# Patient Record
Sex: FEMALE | Race: BLACK OR AFRICAN AMERICAN | Employment: PART TIME | URBAN - METROPOLITAN AREA
[De-identification: names, ages, dates, MRNs, and addresses within clinical notes are randomized per-mention and may not be internally consistent; named-entity substitution may affect disease eponyms.]

---

## 2018-01-19 ENCOUNTER — HOSPITAL ENCOUNTER (EMERGENCY)
Age: 33
Discharge: HOME | End: 2018-01-19
Attending: EMERGENCY MEDICINE | Admitting: EMERGENCY MEDICINE
Payer: MEDICAID

## 2018-01-19 ENCOUNTER — APPOINTMENT (OUTPATIENT)
Dept: ULTRASOUND IMAGING | Age: 33
End: 2018-01-19
Attending: PHYSICIAN ASSISTANT
Payer: MEDICAID

## 2018-01-19 VITALS
TEMPERATURE: 99.2 F | HEART RATE: 70 BPM | RESPIRATION RATE: 15 BRPM | DIASTOLIC BLOOD PRESSURE: 79 MMHG | SYSTOLIC BLOOD PRESSURE: 125 MMHG | HEIGHT: 68 IN | OXYGEN SATURATION: 100 % | WEIGHT: 135 LBS | BODY MASS INDEX: 20.46 KG/M2

## 2018-01-19 DIAGNOSIS — O03.9 MISCARRIAGE: ICD-10-CM

## 2018-01-19 DIAGNOSIS — N93.9 VAGINAL BLEEDING: Primary | ICD-10-CM

## 2018-01-19 DIAGNOSIS — E87.6 HYPOKALEMIA: ICD-10-CM

## 2018-01-19 LAB
ABO + RH BLD: NORMAL
ALBUMIN SERPL-MCNC: 3.6 G/DL (ref 3.5–5)
ALBUMIN/GLOB SERPL: 0.8 {RATIO} (ref 1.1–2.2)
ALP SERPL-CCNC: 73 U/L (ref 45–117)
ALT SERPL-CCNC: 68 U/L (ref 12–78)
ANION GAP SERPL CALC-SCNC: 4 MMOL/L (ref 5–15)
APTT PPP: 30 SEC (ref 22.1–32.5)
AST SERPL-CCNC: 48 U/L (ref 15–37)
BASOPHILS # BLD: 0 K/UL (ref 0–0.1)
BASOPHILS NFR BLD: 0 % (ref 0–1)
BILIRUB SERPL-MCNC: 0.4 MG/DL (ref 0.2–1)
BLOOD GROUP ANTIBODIES SERPL: NORMAL
BUN SERPL-MCNC: 5 MG/DL (ref 6–20)
BUN/CREAT SERPL: 8 (ref 12–20)
CALCIUM SERPL-MCNC: 8.6 MG/DL (ref 8.5–10.1)
CHLORIDE SERPL-SCNC: 101 MMOL/L (ref 97–108)
CO2 SERPL-SCNC: 30 MMOL/L (ref 21–32)
CREAT SERPL-MCNC: 0.64 MG/DL (ref 0.55–1.02)
EOSINOPHIL # BLD: 0.1 K/UL (ref 0–0.4)
EOSINOPHIL NFR BLD: 1 % (ref 0–7)
ERYTHROCYTE [DISTWIDTH] IN BLOOD BY AUTOMATED COUNT: 15.5 % (ref 11.5–14.5)
GLOBULIN SER CALC-MCNC: 4.3 G/DL (ref 2–4)
GLUCOSE SERPL-MCNC: 101 MG/DL (ref 65–100)
HCG SERPL-ACNC: ABNORMAL MIU/ML (ref 0–6)
HCT VFR BLD AUTO: 32.4 % (ref 35–47)
HGB BLD-MCNC: 10.7 G/DL (ref 11.5–16)
INR PPP: 1.1 (ref 0.9–1.1)
LYMPHOCYTES # BLD: 2.1 K/UL (ref 0.8–3.5)
LYMPHOCYTES NFR BLD: 24 % (ref 12–49)
MCH RBC QN AUTO: 26.6 PG (ref 26–34)
MCHC RBC AUTO-ENTMCNC: 33 G/DL (ref 30–36.5)
MCV RBC AUTO: 80.4 FL (ref 80–99)
MONOCYTES # BLD: 0.9 K/UL (ref 0–1)
MONOCYTES NFR BLD: 10 % (ref 5–13)
NEUTS SEG # BLD: 5.9 K/UL (ref 1.8–8)
NEUTS SEG NFR BLD: 65 % (ref 32–75)
PLATELET # BLD AUTO: 261 K/UL (ref 150–400)
POTASSIUM SERPL-SCNC: 3.1 MMOL/L (ref 3.5–5.1)
PROT SERPL-MCNC: 7.9 G/DL (ref 6.4–8.2)
PROTHROMBIN TIME: 11 SEC (ref 9–11.1)
RBC # BLD AUTO: 4.03 M/UL (ref 3.8–5.2)
SODIUM SERPL-SCNC: 135 MMOL/L (ref 136–145)
SPECIMEN EXP DATE BLD: NORMAL
THERAPEUTIC RANGE,PTTT: NORMAL SECS (ref 58–77)
WBC # BLD AUTO: 9 K/UL (ref 3.6–11)

## 2018-01-19 PROCEDURE — 74011250636 HC RX REV CODE- 250/636: Performed by: PHYSICIAN ASSISTANT

## 2018-01-19 PROCEDURE — 74011250637 HC RX REV CODE- 250/637: Performed by: OBSTETRICS & GYNECOLOGY

## 2018-01-19 PROCEDURE — 85610 PROTHROMBIN TIME: CPT | Performed by: PHYSICIAN ASSISTANT

## 2018-01-19 PROCEDURE — 86900 BLOOD TYPING SEROLOGIC ABO: CPT | Performed by: PHYSICIAN ASSISTANT

## 2018-01-19 PROCEDURE — 85730 THROMBOPLASTIN TIME PARTIAL: CPT | Performed by: PHYSICIAN ASSISTANT

## 2018-01-19 PROCEDURE — 96372 THER/PROPH/DIAG INJ SC/IM: CPT

## 2018-01-19 PROCEDURE — 96361 HYDRATE IV INFUSION ADD-ON: CPT

## 2018-01-19 PROCEDURE — 36415 COLL VENOUS BLD VENIPUNCTURE: CPT | Performed by: PHYSICIAN ASSISTANT

## 2018-01-19 PROCEDURE — 76817 TRANSVAGINAL US OBSTETRIC: CPT

## 2018-01-19 PROCEDURE — 88305 TISSUE EXAM BY PATHOLOGIST: CPT | Performed by: PHYSICIAN ASSISTANT

## 2018-01-19 PROCEDURE — 74011250636 HC RX REV CODE- 250/636: Performed by: OBSTETRICS & GYNECOLOGY

## 2018-01-19 PROCEDURE — 74011250637 HC RX REV CODE- 250/637: Performed by: PHYSICIAN ASSISTANT

## 2018-01-19 PROCEDURE — 99284 EMERGENCY DEPT VISIT MOD MDM: CPT

## 2018-01-19 PROCEDURE — 80053 COMPREHEN METABOLIC PANEL: CPT | Performed by: PHYSICIAN ASSISTANT

## 2018-01-19 PROCEDURE — 85025 COMPLETE CBC W/AUTO DIFF WBC: CPT | Performed by: PHYSICIAN ASSISTANT

## 2018-01-19 PROCEDURE — 84702 CHORIONIC GONADOTROPIN TEST: CPT | Performed by: PHYSICIAN ASSISTANT

## 2018-01-19 PROCEDURE — 96360 HYDRATION IV INFUSION INIT: CPT

## 2018-01-19 RX ORDER — POTASSIUM CHLORIDE 20 MEQ/1
40 TABLET, EXTENDED RELEASE ORAL
Status: COMPLETED | OUTPATIENT
Start: 2018-01-19 | End: 2018-01-19

## 2018-01-19 RX ORDER — MISOPROSTOL 100 UG/1
800 TABLET ORAL ONCE
Status: COMPLETED | OUTPATIENT
Start: 2018-01-19 | End: 2018-01-19

## 2018-01-19 RX ADMIN — POTASSIUM CHLORIDE 40 MEQ: 20 TABLET, EXTENDED RELEASE ORAL at 11:30

## 2018-01-19 RX ADMIN — HUMAN RHO(D) IMMUNE GLOBULIN 0.3 MG: 300 INJECTION, SOLUTION INTRAMUSCULAR at 13:49

## 2018-01-19 RX ADMIN — Medication 800 MCG: at 13:10

## 2018-01-19 RX ADMIN — SODIUM CHLORIDE 1000 ML: 900 INJECTION, SOLUTION INTRAVENOUS at 10:08

## 2018-01-19 NOTE — ED NOTES
Patient states bleeding has decreased tremendously. MD Cary Moreno made aware. [id number] Patient provided with paper scrubs. Patient currently looking for a ride home.

## 2018-01-19 NOTE — ED PROVIDER NOTES
EMERGENCY DEPARTMENT HISTORY AND PHYSICAL EXAM      Date: 2018  Patient Name: Ijeoma Ramesh    History of Presenting Illness     Chief Complaint   Patient presents with    Vaginal Bleeding     began yesterday. going through 1 pad an hour       History Provided By: Patient    HPI: Ijeoma Ramesh, 28 y.o. female with PMHx significant for sickle cell trait, presents ambulatory to the ED with cc of sudden onset vaginal bleeding x yesterday. Pt also c/o nausea x week. Pt reports missing her cycle last month. She thought she was pregnant but notes vaginal bleeding yesterday. Pt thought she came on her cycle but reports seeing a large blood clot in the toilet. She denies taking any home pregnancy test. She did have recent sexual intercourse but does note using condoms. Pt specifically denies any abdominal pain or cramping, vomiting, dysuria, hematuria, urinary frequency, CP, SOB, cough, breast tenderness. Social Hx: + Tobacco (0.5 ppd), + EtOH (weekends), + illicit drug use (heroin)    PCP: None    There are no other complaints, changes, or physical findings at this time.         Past History     Past Medical History:  Past Medical History:   Diagnosis Date    Abnormal Papanicolaou smear of cervix     Back pain     Breast disorder     lump in left breast    Fatigue     Ill-defined condition     Sickle Cell    Joint pain     Musculoskeletal disorder     Sickle cell trait (HCC)     trait       Past Surgical History:  Past Surgical History:   Procedure Laterality Date     DELIVERY ONLY      c/s in     HX GYN             Family History:  Family History   Problem Relation Age of Onset    Diabetes Maternal Grandmother     Hypertension Maternal Grandmother     Cancer Maternal Grandfather        Social History:  Social History   Substance Use Topics    Smoking status: Current Every Day Smoker     Packs/day: 0.50     Years: 8.00    Smokeless tobacco: None    Alcohol use No Comment: weekends       Allergies:  No Known Allergies      Review of Systems   Review of Systems   Constitutional: Negative. Negative for activity change, appetite change, chills, fatigue, fever and unexpected weight change. HENT: Negative. Negative for congestion, hearing loss, rhinorrhea, sneezing and voice change. Eyes: Negative. Negative for pain and visual disturbance. Respiratory: Negative. Negative for apnea, cough, choking, chest tightness and shortness of breath. Cardiovascular: Negative. Negative for chest pain and palpitations. Gastrointestinal: Positive for nausea. Negative for abdominal distention, abdominal pain, blood in stool, diarrhea and vomiting. Genitourinary: Positive for vaginal bleeding. Negative for difficulty urinating, dysuria, flank pain, frequency and urgency. Musculoskeletal: Negative. Negative for arthralgias, back pain, myalgias and neck stiffness. Skin: Negative. Negative for color change and rash. Neurological: Negative. Negative for dizziness, seizures, syncope, speech difficulty, weakness, numbness and headaches. Hematological: Negative for adenopathy. Psychiatric/Behavioral: Negative. Negative for agitation, behavioral problems, dysphoric mood and suicidal ideas. The patient is not nervous/anxious. Physical Exam   Physical Exam   Constitutional: She is oriented to person, place, and time. She appears well-developed and well-nourished. No distress. HENT:   Head: Normocephalic and atraumatic. Right Ear: External ear normal.   Left Ear: External ear normal.   Nose: Nose normal.   Mouth/Throat: Oropharynx is clear and moist. No oropharyngeal exudate. Eyes: Conjunctivae and EOM are normal. Pupils are equal, round, and reactive to light. Right eye exhibits no discharge. Left eye exhibits no discharge. No scleral icterus. Neck: Normal range of motion. Neck supple. No JVD present. No tracheal deviation present.    Cardiovascular: Normal rate, regular rhythm, normal heart sounds and intact distal pulses. Exam reveals no gallop and no friction rub. No murmur heard. Pulmonary/Chest: Effort normal and breath sounds normal. No respiratory distress. She has no wheezes. She has no rales. She exhibits no tenderness. Abdominal: Soft. Bowel sounds are normal. She exhibits no distension and no mass. There is no tenderness. There is no rigidity, no rebound and no guarding. Genitourinary:   Genitourinary Comments: PELVIC EXAM:  Large amount of what appears to be a large organized clot. No definite product of conception. No significant Tenderness. Musculoskeletal: Normal range of motion. She exhibits no edema or tenderness. Lymphadenopathy:     She has no cervical adenopathy. Neurological: She is alert and oriented to person, place, and time. She has normal reflexes. No cranial nerve deficit. She exhibits normal muscle tone. Coordination normal.   Skin: Skin is warm and dry. She is not diaphoretic. Psychiatric: She has a normal mood and affect. Her behavior is normal. Judgment and thought content normal.   Nursing note and vitals reviewed. Diagnostic Study Results     Labs -     Recent Results (from the past 12 hour(s))   BETA HCG, QT    Collection Time: 01/19/18  8:20 AM   Result Value Ref Range    [place] [address] (H) 0 - 6 MIU/ML   CBC WITH AUTOMATED DIFF    Collection Time: 01/19/18 10:09 AM   Result Value Ref Range    WBC 9.0 3.6 - 11.0 K/uL    RBC 4.03 3.80 - 5.20 M/uL    HGB 10.7 (L) 11.5 - 16.0 g/dL    HCT 32.4 (L) 35.0 - 47.0 %    MCV 80.4 80.0 - 99.0 FL    MCH 26.6 26.0 - 34.0 PG    MCHC 33.0 30.0 - 36.5 g/dL    RDW 15.5 (H) 11.5 - 14.5 %    PLATELET 745 669 - 721 K/uL    NEUTROPHILS 65 32 - 75 %    LYMPHOCYTES 24 12 - 49 %    MONOCYTES 10 5 - 13 %    EOSINOPHILS 1 0 - 7 %    BASOPHILS 0 0 - 1 %    ABS. NEUTROPHILS 5.9 1.8 - 8.0 K/UL    ABS. LYMPHOCYTES 2.1 0.8 - 3.5 K/UL    ABS. MONOCYTES 0.9 0.0 - 1.0 K/UL    ABS. EOSINOPHILS 0.1 0.0 - 0.4 K/UL    ABS. BASOPHILS 0.0 0.0 - 0.1 K/UL   METABOLIC PANEL, COMPREHENSIVE    Collection Time: 01/19/18 10:09 AM   Result Value Ref Range    Sodium 135 (L) 136 - 145 mmol/L    Potassium 3.1 (L) 3.5 - 5.1 mmol/L    Chloride 101 97 - 108 mmol/L    CO2 30 21 - 32 mmol/L    Anion gap 4 (L) 5 - 15 mmol/L    Glucose 101 (H) 65 - 100 mg/dL    BUN 5 (L) 6 - 20 MG/DL    Creatinine 0.64 0.55 - 1.02 MG/DL    BUN/Creatinine ratio 8 (L) 12 - 20      GFR est AA >60 >60 ml/min/1.73m2    GFR est non-AA >60 >60 ml/min/1.73m2    Calcium 8.6 8.5 - 10.1 MG/DL    Bilirubin, total 0.4 0.2 - 1.0 MG/DL    ALT (SGPT) 68 12 - 78 U/L    AST (SGOT) 48 (H) 15 - 37 U/L    Alk. phosphatase 73 45 - 117 U/L    Protein, total 7.9 6.4 - 8.2 g/dL    Albumin 3.6 3.5 - 5.0 g/dL    Globulin 4.3 (H) 2.0 - 4.0 g/dL    A-G Ratio 0.8 (L) 1.1 - 2.2     PROTHROMBIN TIME + INR    Collection Time: 01/19/18 10:09 AM   Result Value Ref Range    INR 1.1 0.9 - 1.1      Prothrombin time 11.0 9.0 - 11.1 sec   PTT    Collection Time: 01/19/18 10:09 AM   Result Value Ref Range    aPTT 30.0 22.1 - 32.5 sec    aPTT, therapeutic range     58.0 - 77.0 SECS   TYPE & SCREEN    Collection Time: 01/19/18 10:09 AM   Result Value Ref Range    Crossmatch Expiration 01/22/2018     ABO/Rh(D) O NEGATIVE     Antibody screen NEG    RH IMMUNE GLOBULIN PROPHYLACTIC    Collection Time: 01/19/18  1:37 PM   Result Value Ref Range    Comment 8 WKS PER SHAYLEE DEVLIN     Unit number [id number]     Blood component type RH IMMUNE GLOBULIN     Unit division 00     Status of unit ISSUED        Radiologic Studies -   US UTS TRANSVAGINAL OB   Final Result   Study Result      EXAM:  US UTS TRANSVAGINAL OB      INDICATION: Vaginal bleeding.     COMPARISON: None.     TECHNIQUE:  Real-time endovaginal sonography of the pelvis was performed using.  Multiple  static images of the uterus and ovaries were obtained.     FINDINGS:  UTERUS:  Uterus measures 8.4 x 6.4 x 5.6 cm and there is 1.5 x 1.3 cm hypoechoic area in  the myometrium may represent small leiomyoma. No definite intrauterine  gestational sac is identified.     ENDOMETRIUM:  The endometrial stripe measures 15 mm.      RIGHT OVARY:  The right ovary measures 4.8 x 2 x 3.8 cm. . There is a 2.4 x 1.5 x 2.1 cm  hypoechoic area which is inseparable from the right ovary with a small cystic  area centrally. Flow is identified to the right ovary     LEFT OVARY:  The left ovary measures 2.2 x 1.2 x 2.9 cm and flow is identified.     CUL-DE-SAC:  There is a trace amount of free fluid in the cul-de-sac     IMPRESSION  IMPRESSION:    1. Endometrium is slightly thickened measuring 15 mm and no intrauterine  gestational sac is identified. The thickened endometrium could be related to  spontaneous  or possibly to an ectopic. ..  2. There is a 2.4 x 1.5 x 2.1 cm hypoechoic area which blends in with the right  ovary contains a small cystic area and could represent an ectopic or possibly  corpus luteum. Medical Decision Making   I am the first provider for this patient. I reviewed the vital signs, available nursing notes, past medical history, past surgical history, family history and social history. Vital Signs-Reviewed the patient's vital signs. Patient Vitals for the past 12 hrs:   Temp Pulse Resp BP SpO2   18 1146 - 70 15 125/79 100 %   18 0813 99.2 °F (37.3 °C) 83 14 (!) 128/95 99 %     Records Reviewed: Nursing Notes    Provider Notes (Medical Decision Making):   DDx: Pregnancy, menorrhagia. ED Course:   Initial assessment performed. The patients presenting problems have been discussed, and they are in agreement with the care plan formulated and outlined with them. I have encouraged them to ask questions as they arise throughout their visit. PROGRESS NOTE:  9:40 AM  Pt's quant is elevated.  Will order an US and perform a pelvic exam.     Procedure Note - Pelvic Exam:    9:44 AM  Performed by: Antonia Moore [place]  Chaperoned by: Sundar Lira  tech. Pelvic exam was performed using bimanual and speculum. Further findings noted in physical exam.   The procedure took 1-15 minutes, and pt tolerated well. PROGRESS NOTE:  9:58 AM  Pt's clots were placed in a specimen bottle and plan to send them off to lab for a pathology study. PROGRESS NOTE:  10:52 AM  Spoke with  tech as she was exiting the room, she did not see anything in the uterus and was unsure if she saw something in her tubes. Pending official reading. Nursing notes the pt has been pregnant 7 times total but miscarried 6 of her pregnancies. Pt is a A6.    CONSULT NOTE:   11:32 AM  ROWENA Chicas spoke with Dr. Chris Mcclain,   Specialty: OB/GYN [facility]. Discussed pt's hx, disposition, and available diagnostic and imaging results. Reviewed care plans. Consultant agrees with plans as outlined. She will come in to see the pt. PROGRESS NOTE:  12:29 PM  Dr. Chris Mcclain at bedside to evaluate pt. PROGRESS NOTE:  2:40 PM  Dr. Chris Mcclain states the pt can be discharged when she finishes her current treatment. Critical Care Time:   None. Disposition:  DISCHARGE NOTE  3:11 PM  The patient has been re-evaluated and is ready for discharge. Reviewed available results with patient. Counseled pt on diagnosis and care plan. Pt has expressed understanding, and all questions have been answered. Pt agrees with plan and agrees to F/U as recommended, or return to the ED if their sxs worsen. Discharge instructions have been provided and explained to the pt, along with reasons to return to the ED. PLAN:  1. There are no discharge medications for this patient.     2.   Follow-up Information     Follow up With Details Comments Contact Info    None In 2 days As needed None ([phone]) Patient stated that they have no PCP      Jenna Billings MD  as scheduled [id number] [facility]  [phone] Return to ED if worse     Diagnosis     Clinical Impression:   1. Vaginal bleeding    2. Hypokalemia    3. Miscarriage        Attestations: This note is prepared by Anu Waters acting as Scribe for [employer]    ROWENA Lennon : The scribe's documentation has been prepared under my direction and personally reviewed by me in its entirety. I confirm that the note above accurately reflects all work, treatment, procedures, and medical decision making performed by me.

## 2018-01-19 NOTE — ED NOTES
Patient reports to ED with complaints of vaginal bleeding that began yesterday. Patient states her last cycle was the beginning of December and she began to think she was pregnant because she missed her cycle for January and she was experiencing pregnancy symptoms such as n/v and fatigue. Patient states yesterday, she began to spotting, and today, she woke up with increased bleeding. She states she has been going through approximately 1 pad an hour and she has bled through her pants. Patient also reports clots. Daughter at bedside.

## 2018-01-19 NOTE — ED NOTES
Patient sheets changed prior to going to ultrasound. Patient provided with extra pads and maternity pants. Patient's daughter remains in ED room while patient to [address]. Daughter provided with juice and crackers.

## 2018-01-19 NOTE — DISCHARGE INSTRUCTIONS
Abnormal Uterine Bleeding: Care Instructions  Your Care Instructions    Abnormal uterine bleeding (AUB) is irregular bleeding from the uterus that is longer or heavier than usual or does not occur at your regular time. Sometimes it is caused by changes in hormone levels. It can also be caused by growths in the uterus, such as fibroids or polyps. Sometimes a cause cannot be found. You may have heavy bleeding when you are not expecting your period. Your doctor may suggest a pregnancy test, if you think you are pregnant. Follow-up care is a key part of your treatment and safety. Be sure to make and go to all appointments, and call your doctor if you are having problems. It's also a good idea to know your test results and keep a list of the medicines you take. How can you care for yourself at home? · Be safe with medicines. Take pain medicines exactly as directed. ¨ If the doctor gave you a prescription medicine for pain, take it as prescribed. ¨ If you are not taking a prescription pain medicine, ask your doctor if you can take an over-the-counter medicine. · You may be low in iron because of blood loss. Eat a balanced diet that is high in iron and vitamin C. Foods rich in iron include red meat, shellfish, eggs, beans, and leafy green vegetables. Talk to your doctor about whether you need to take iron pills or a multivitamin. When should you call for help? Call 911 anytime you think you may need emergency care. For example, call if:  ? · You passed out (lost consciousness). ?Call your doctor now or seek immediate medical care if:  ? · You have new or worse belly or pelvic pain. ? · You have severe vaginal bleeding. ? · You feel dizzy or lightheaded, or you feel like you may faint. ? Watch closely for changes in your health, and be sure to contact your doctor if:  ? · You think you may be pregnant. ? · Your bleeding gets worse. ? · You do not get better as expected.    Where can you learn more?  Go to http://dav-marcella.info/. Enter W508 in the search box to learn more about \"Abnormal Uterine Bleeding: Care Instructions. \"  Current as of: October 13, 2016  Content Version: 11.4  © 9849-9671 MEETiiN. Care instructions adapted under license by [employer] (which disclaims liability or warranty for this information). If you have questions about a medical condition or this instruction, always ask your healthcare professional. [address] any warranty or liability for your use of this information. Electrolyte Imbalance: Care Instructions  Your Care Instructions  Electrolytes are minerals in your blood. They include sodium, potassium, calcium, and magnesium. When they are not at the right levels, you can feel very ill. You may not know what is causing it, but you know something is wrong. You may feel weak or numb, have muscle spasms, or twitch. Your heart may beat fast. Symptoms are different with each mineral. Too much is as bad as too little. Minerals help keep your body working as it should. Vomiting, diarrhea, and fever can cause you to lose minerals. A problem with your kidneys can tip a mineral out of balance. So can taking certain medicines. Your doctor may do more tests. He or she may change your medicine and diet. If you are low in one or more minerals, they may be given through a tube into your vein (IV). Your doctor may have you take or drink special fluids or pills. If your kidneys are failing, your blood may be filtered. This is called dialysis. It can put the minerals back in balance. Follow-up care is a key part of your treatment and safety. Be sure to make and go to all appointments, and call your doctor if you are having problems. It's also a good idea to know your test results and keep a list of the medicines you take. How can you care for yourself at home? · Take your medicines exactly as prescribed.  Call your doctor if you have any problems with your medicines. You will get more details on the specific medicines your doctor prescribes. · Do not take any medicine without talking to your doctor first. This includes prescription, over-the-counter, and herbal medicines. · If you have kidney, heart, or liver disease and have to limit fluids, talk with your doctor before you increase the amount of fluids you drink. · Your doctor or dietitian may give you a diet plan to help balance your minerals. Follow the diet carefully. When should you call for help? Call 911 anytime you think you may need emergency care. For example, call if:  ? · You passed out (lost consciousness). ? · Your heartbeat seems to be irregular. It might be speeding up and then slowing down or skipping beats. ?Call your doctor now or seek immediate medical care if:  ? · You have muscle aches. ? · You feel very weak. ? · You are confused or cannot think clearly. ? Watch closely for changes in your health, and be sure to contact your doctor if:  ? · You do not get better as expected. Where can you learn more? Go to http://dav-marcella.info/. Enter A198 in the search box to learn more about \"Electrolyte Imbalance: Care Instructions. \"  Current as of: May 12, 2017  Content Version: 11.4  © 0408-6120 Polar OLED. Care instructions adapted under license by [employer]uz[employer] (which disclaims liability or warranty for this information). If you have questions about a medical condition or this instruction, always ask your healthcare professional. [place] [address] any warranty or liability for your use of this information. Hypokalemia: Care Instructions  Your Care Instructions    Hypokalemia (say \"hy-mj-prd-CHANDAN-enoch-uh\") is a low level of potassium. The heart, muscles, kidneys, and nervous system all need potassium to work well. This problem has many different causes.  Kidney problems, diet, and medicines like diuretics and laxatives can cause it. So can vomiting or diarrhea. In some cases, cancer is the cause. Your doctor may do tests to find the cause of your low potassium levels. You may need medicines to bring your potassium levels back to normal. You may also need regular blood tests to check your potassium. If you have very low potassium, you may need intravenous (IV) medicines. You also may need tests to check the electrical activity of your heart. Heart problems caused by low potassium levels can be very serious. Follow-up care is a key part of your treatment and safety. Be sure to make and go to all appointments, and call your doctor if you are having problems. It's also a good idea to know your test results and keep a list of the medicines you take. How can you care for yourself at home? · If your doctor recommends it, eat foods that have a lot of potassium. These include fresh fruits, juices, and vegetables. They also include nuts, beans, and milk. · Be safe with medicines. If your doctor prescribes medicines or potassium supplements, take them exactly as directed. Call your doctor if you have any problems with your medicines. · Get your potassium levels tested as often as your doctor tells you. When should you call for help? Call 911 anytime you think you may need emergency care. For example, call if:  ? · You feel like your heart is missing beats. Heart problems caused by low potassium can cause death. ? · You passed out (lost consciousness). ? · You have a seizure. ?Call your doctor now or seek immediate medical care if:  ? · You feel weak or unusually tired. ? · You have severe arm or leg cramps. ? · You have tingling or numbness. ? · You feel sick to your stomach, or you vomit. ? · You have belly cramps. ? · You feel bloated or constipated. ? · You have to urinate a lot. ? · You feel very thirsty most of the time.    ? · You are dizzy or lightheaded, or you feel like you may faint. ? · You feel depressed, or you lose touch with reality. ? Watch closely for changes in your health, and be sure to contact your doctor if:  ? · You do not get better as expected. Where can you learn more? Go to http://dav-marcella.info/. Enter G358 in the search box to learn more about \"Hypokalemia: Care Instructions. \"  Current as of: May 12, 2017  Content Version: 11.4  © 8669-9316 Fly6. Care instructions adapted under license by Viddsee (which disclaims liability or warranty for this information). If you have questions about a medical condition or this instruction, always ask your healthcare professional. [address] any warranty or liability for your use of this information. Miscarriage: Care Instructions  Your Care Instructions    The loss of a pregnancy can be very hard. You may wonder why it happened or blame yourself. Miscarriages are common and are not caused by exercise, stress, or sex. Most happen because the fertilized egg in the uterus does not develop normally. There is no treatment that can stop a miscarriage. As long as you do not have heavy blood loss, fever, weakness, or other signs of infection, you can let a miscarriage follow its own course. This can take several days. Your body will recover over the next several weeks. Having a miscarriage does not mean you cannot have a normal pregnancy in the future. The doctor has checked you carefully, but problems can develop later. If you notice any problems or new symptoms, get medical treatment right away. Follow-up care is a key part of your treatment and safety. Be sure to make and go to all appointments, and call your doctor if you are having problems. It's also a good idea to know your test results and keep a list of the medicines you take. How can you care for yourself at home?   · You will probably have some vaginal bleeding for 1 to 2 weeks. It may be similar to or slightly heavier than a normal period. The bleeding should get lighter after a week. Use pads instead of tampons. You may use tampons during your next period, which should start in 3 to 6 weeks. · Take an over-the-counter pain medicine, such as acetaminophen (Tylenol), ibuprofen (Advil, Motrin), or naproxen (Aleve) for cramps. Read and follow all instructions on the label. You may have cramps for several days after the miscarriage. · Do not take two or more pain medicines at the same time unless the doctor told you to. Many pain medicines have acetaminophen, which is Tylenol. Too much acetaminophen (Tylenol) can be harmful. · Use a clear container to save any tissue that you pass. Take it to your doctor's office as soon as you can. · Do not have sex until the bleeding stops. · You may return to your normal activities if you feel well enough to do so. But you should avoid heavy exercise until the bleeding stops. · If you plan to get pregnant again, check with your doctor. Most doctors suggest waiting until you have had at least one normal period before you try to get pregnant. · If you do not want to get pregnant, ask your doctor about birth control. You can get pregnant again before your next period starts if you are not using birth control. · You may be low in iron because of blood loss. Eat a balanced diet that is high in iron and vitamin C. Foods rich in iron include red meat, shellfish, eggs, beans, and leafy green vegetables. Foods high in vitamin C include citrus fruits, tomatoes, and broccoli. Talk to your doctor about whether you need to take iron pills or a multivitamin. · The loss of a pregnancy can be very hard. You may wonder why it happened and blame yourself. Talking to family members, friends, a counselor, or your doctor may help you cope with your loss. When should you call for help? Call 911 anytime you think you may need emergency care.  For example, call if:  ? · You passed out (lost consciousness). ?Call your doctor now or seek immediate medical care if:  ? · You have severe vaginal bleeding. ? · You are dizzy or lightheaded, or you feel like you may faint. ? · You have new or worse pain in your belly or pelvis. ? · You have a fever. ? · You have vaginal discharge that smells bad. ? Watch closely for changes in your health, and be sure to contact your doctor if:  ? · You do not get better as expected. Where can you learn more? Go to http://dav-marcella.info/. Enter E802 in the search box to learn more about \"Miscarriage: Care Instructions. \"  Current as of: March 16, 2017  Content Version: 11.4  © 3285-9715 SheZoom. Care instructions adapted under license by [employer] (which disclaims liability or warranty for this information). If you have questions about a medical condition or this instruction, always ask your healthcare professional. [address] any warranty or liability for your use of this information.

## 2018-01-20 LAB
BLD PROD TYP BPU: NORMAL
BPU ID: NORMAL
GA (WEEKS): NORMAL WK
STATUS OF UNIT,%ST: NORMAL
UNIT DIVISION, %UDIV: 0

## 2018-01-29 NOTE — CONSULTS
Primary Provider:  Chata Rankin MD      History of Present Illness:  [id number] presented to ER. Thinks she is 3 weeks late for menses. Started bleeding 2 days ago. Yesterday bleeding was very heavy and she passed a large clot in the toilet. Bleeding continued heavy today so she came to the ER.  MIld pain but not severe. Now dizziness, palpitations. Known Rh neg. In ER quant 24,000. Ultrasound showed thickened endometrium, normal adnexa. No IUP indentified. Initial exam showed clot in the vagina which was sent to pathology. Vital Signs   28Years Old Female  BP:       120/60    Past Pregnancy History   : 7  Para:     2  Aborta:  4  Term: 1, Premature: 2, Living Children: 1, Vaginal Deliveries: 1, C-Sections: 1, Elect. Ab: 3, Ectopics: 0    Gynecologic History   History of abnormal pap: yes  Gardasil Injection History: Pt Advised/Considering Gardasil  Pt currently sexually active: no  Pt ever sexually active: yes  Current Contraception: None.    History of STD: yes   STD Type: HPV     Allergies      Medications Removed from Medication List          Past Medical History:     Reviewed history from 2016 and no changes required:        Seasonal Allergies        sickle cell trait      Past Surgical History:     Reviewed history from 2016 and no changes required:        [id number]        Vaginal Deliveryx1 (stillborn) @ 24 weeks-        EAB x3 (per pt 3/15 DB)        [id number]  of IUFD at 21 5/7 wks   Dr. Adam Reyes    Family History Summary:      Reviewed history Last on 2016 and no changes required:2018  MGF - Has Family History of Colon Cancer - Entered On: 2016    General Comments - FH:  [facility] [employer] [facility] [address] [place] [address]     Social History:     Reviewed history from 2012 and no changes required:        Single        Works at [employer]        Past Pregnancy History      :  7     Premature Births: 2    Pregnancy # 7     Delivery date:   2018     Comments:  presumed sab in ER      Review of Systems        See HPI    Except as noted in the HPI, the review of systems is negative for General, Breast, , Resp, GI, Endo, MS, Psych and Heme. General Medical Physical Exam:     General Appearance:       no acute distress    Head: Inspection:  normal    Eyes:        External:  EOM intact    Ears, Nose, Throat:        External:  normocephalic and atraumatic       Hearing:  grossly intact    Neck:        Neck:  supple; no masses; trachea midline    Respiratory:        Resp. effort:  no use of accessory muscles    Cardiovascular:       Peripheral circ: no cyanosis, clubbing, or edema    Gastrointestinal:        Abdomen:  soft and non-tender; no masses       Liver/spleen:     no enlargement or nodularity       Hernia:  no hernias    Genitourinary:        Ext. genitalia: covered in blood       Urethra:  no discharge       Bladder:  no cystocele       Vagina:  large amount of blood in vault       Cervix:  slightly open, clot coming from os       Uterus:  slightly enlarged, on tender       Adnexa:  no masses or tenderness    Musculoskeletal:        Gait/station:  normal gait    Skin:        Inspection:  no rashes, suspicious lesions, or ulcerations    Neurological:        Other:  grossly intact    Psychiatric:       Judgement:  intact       Orientation:  oriented to time, place, and person              Impression & Recommendations:    Problem # 1:  Incomplete  (ICD-634.91) (QIM42-O47.4)  Exam and history c/w spontaneous ab and not ectopic. Likely she passed the bulk of the tissue at home. Ultrasound does not show obvious retained but bleeding is still brisk. Vitals are stable and pain is controlled. Will try buccal misoprostol to help with uterine contractility and see if bleeding normalizes. If it does not, recommend D&C. Risks and benefits reviewed. Will keep npo for now. 1 hour after miso bleeding improved drastically. will d/c home. She has strict precautions to return if bleeding returns to the amount it was upon arrival today. If bleeding is stable, she will see me in the office on Monday for exam and quant as we have not completely verified this was an IUP.     Will give rhogam in hospital.        Orders:  Patient Sent to Hospital (CPT-HOSPGYN)  ER Outpatient Obs - High (CPT-AdmitF)      Medications (at conclusion of this visit)    01/18/2016 ZOFRAN ODT 4 MG ORAL TABLET DISINTEGRATING (ONDANSETRON) 1 by mouth every 4--6 hrs prn for nausea and vomiting              ________________________________________________________________________

## 2018-04-16 ENCOUNTER — HOSPITAL ENCOUNTER (EMERGENCY)
Age: 33
Discharge: HOME | End: 2018-04-16
Attending: EMERGENCY MEDICINE
Payer: MEDICAID

## 2018-04-16 VITALS
SYSTOLIC BLOOD PRESSURE: 132 MMHG | TEMPERATURE: 99 F | DIASTOLIC BLOOD PRESSURE: 77 MMHG | HEART RATE: 77 BPM | RESPIRATION RATE: 18 BRPM | OXYGEN SATURATION: 100 % | WEIGHT: 121.47 LBS | HEIGHT: 68 IN | BODY MASS INDEX: 18.41 KG/M2

## 2018-04-16 DIAGNOSIS — Z20.2 POSSIBLE EXPOSURE TO STD: ICD-10-CM

## 2018-04-16 DIAGNOSIS — N75.0 BARTHOLIN GLAND CYST: Primary | ICD-10-CM

## 2018-04-16 LAB
APPEARANCE UR: ABNORMAL
BACTERIA URNS QL MICRO: NEGATIVE /HPF
BILIRUB UR QL: NEGATIVE
COLOR UR: ABNORMAL
EPITH CASTS URNS QL MICRO: ABNORMAL /LPF
GLUCOSE UR STRIP.AUTO-MCNC: NEGATIVE MG/DL
HCG UR QL: NEGATIVE
HGB UR QL STRIP: NEGATIVE
KETONES UR QL STRIP.AUTO: NEGATIVE MG/DL
LEUKOCYTE ESTERASE UR QL STRIP.AUTO: ABNORMAL
NITRITE UR QL STRIP.AUTO: NEGATIVE
PH UR STRIP: 5.5 [PH] (ref 5–8)
PROT UR STRIP-MCNC: NEGATIVE MG/DL
RBC #/AREA URNS HPF: ABNORMAL /HPF (ref 0–5)
SP GR UR REFRACTOMETRY: 1.02 (ref 1–1.03)
UA: UC IF INDICATED,UAUC: ABNORMAL
UROBILINOGEN UR QL STRIP.AUTO: 1 EU/DL (ref 0.2–1)
WBC URNS QL MICRO: ABNORMAL /HPF (ref 0–4)

## 2018-04-16 PROCEDURE — 74011250636 HC RX REV CODE- 250/636: Performed by: PHYSICIAN ASSISTANT

## 2018-04-16 PROCEDURE — 81025 URINE PREGNANCY TEST: CPT

## 2018-04-16 PROCEDURE — 74011250637 HC RX REV CODE- 250/637: Performed by: PHYSICIAN ASSISTANT

## 2018-04-16 PROCEDURE — 87086 URINE CULTURE/COLONY COUNT: CPT | Performed by: PHYSICIAN ASSISTANT

## 2018-04-16 PROCEDURE — 87491 CHLMYD TRACH DNA AMP PROBE: CPT | Performed by: PHYSICIAN ASSISTANT

## 2018-04-16 PROCEDURE — 96372 THER/PROPH/DIAG INJ SC/IM: CPT

## 2018-04-16 PROCEDURE — 74011000250 HC RX REV CODE- 250: Performed by: PHYSICIAN ASSISTANT

## 2018-04-16 PROCEDURE — 81001 URINALYSIS AUTO W/SCOPE: CPT | Performed by: PHYSICIAN ASSISTANT

## 2018-04-16 PROCEDURE — 99284 EMERGENCY DEPT VISIT MOD MDM: CPT

## 2018-04-16 RX ORDER — AZITHROMYCIN 250 MG/1
1000 TABLET, FILM COATED ORAL
Status: COMPLETED | OUTPATIENT
Start: 2018-04-16 | End: 2018-04-16

## 2018-04-16 RX ADMIN — AZITHROMYCIN 1000 MG: 250 TABLET, FILM COATED ORAL at 22:31

## 2018-04-16 RX ADMIN — LIDOCAINE HYDROCHLORIDE 250 MG: 10 INJECTION, SOLUTION EPIDURAL; INFILTRATION; INTRACAUDAL; PERINEURAL at 22:31

## 2018-04-16 NOTE — LETTER
4/19/2018 Sherren M[address] [id number] Dear Ms. Rand Vigil, You were recently seen in the Emergency Department of [place] [address]. and had lab work performed. We would like to discuss these results with you. Please call the Emergency Department at your earliest convenience at [phone] between 10am-8pm to speak with one of our providers. Sincerely, NAVA Hooker 
 
 
[facility] EMERGENCY DEPT 
[address] [place] [address] 
[phone]

## 2018-04-17 NOTE — ED NOTES
Discharge instructions reviewed with pt. Discharge instructions given to pt per PA. Pt able to return/verbalize discharge instructions. Copy of discharge instructions given. Pt condition stable, respiratory status within normal limits, neuro status intact. Pt ambulatory out of ER, accompanied by male .

## 2018-04-17 NOTE — DISCHARGE INSTRUCTIONS
[facility] Departments     For adult and child immunizations, family planning, TB screening, STD testing and women's health services. [employer]: Álvaro [phone]      [address]   [address]   [address]   [address] [facility] [address]: Duke Morgan [address] [facility] [address] [phone]      [address] [facility]          [address] [place] [address]     For primary care services, woman and child wellness, and some clinics providing specialty care. [facility] -- [address] [facility] [phone]   [address] [facility] 200 [facility] 3614 [facility] [phone]   [address] [facility] [address] [facility] [address] [phone]   [address]  [email or web address] [address] [place] [address] [address]  [phone]   [address] [phone]   [facility] [address] [facility] [phone]   [facility] 1051 [facility] [phone]   [facility]: [facility] [address] [place] [address], [address] [phone]     [address] [phone]   [facility] [address]  [phone]   [employer]  [address] ([email or web address]) [phone]         Sexual Health/Woman Wellness Clinics    For STD/HIV testing and treatment, pregnancy testing and services, men's health, birth control services, LGBT services, and hepatitis/HPV vaccine services. [employer] for Ethel [employer] [address] [facility] [address] [facility] [address] [facility] 1579 [address] [phone]   [facility] [address], 5th floor [phone]   Pregnancy [address] for Women [facility] MICHELE Bajaw [phone]         Specialty Service 17[address] [facility]   [phone]   [employer]   [phone]   Women, Infant and Children's Services: [address] [phone]       [address] [facility]   [phone]   [address]   [facility]     [phone]   [address] Crisis   [address] [phone]     Local Primary Care Physicians  [facility] [phone]  MD Eriberto Wallis MD Mazie Millman, MD [facility] Doctors [phone]  Carlo Hyman, P  MD Becka Brink, MD [place] [address] [phone]  [place] [address] [facility] [phone]  MD Quin Hall MD Dewitt Arms, MD Eda Mail, MD   [facility] [phone]  IDWP MD Cachorro TATUM MD Daralene Schlichter, NP [address] [phone]  MD Fco Tracy MD Annia Sprout, MD Saira Iyer MD Jinnie Drum, MD Driss Quick MD   [address] [facility]  Carmen Marin MD [address] [facility] [address] [phone]  MD Patricia Yao, NP  Elieser Bonilla, MD Lev Luo MD Gaetano Rolling, MD Rosaleen Octave, MD   [address] [facility] [phone]  MD Layla Ferrera, [facility]  Kaylen Diaz, NP  MD Dayan Chen MD Egbert Massa, MD Evorn Angst, MD [facility] [phone]  MD Momo Thomas MD Coni Martinez, MD Kae Campi, MD Bert Maizes, MD   [address] [phone]  MD Emmanuel Hernandez MD [facility] [phone]  MD Char Duarte MD Gennie Dice Tamy Alvares, [address] [facility] [phone]  [employer], MD Olamide Villasenor, MD Joni Zazueta, MD Andre Delgado, MD [address] Moore, SHADI Ramos MD [address]   [phone]  MD Lucinda Jean Baptiste, MD Obi Martinez MD   2[address] [facility] [phone]  Bunny Rodríguez, MD Elisa Milton, FARZAD Diaz, ROWENA Diaz, ROWENA Herring, MD Kevin Springer, NP   Ludwig Nicholson, DO Miscellaneous:  Val Salazar [phone]

## 2018-04-17 NOTE — ED PROVIDER NOTES
EMERGENCY DEPARTMENT HISTORY AND PHYSICAL EXAM      Date: 2018  Patient Name: Magdi Warren    History of Presenting Illness     Chief Complaint   Patient presents with    Skin Problem     patient reports having \"a bump on her vagina\" patient states that it started 3 days ago patient states that the bump is swollen and and painful but not red        History Provided By: Patient    HPI: Magdi Warren, 28 y.o. female with PMHx significant for sickle cell trait / hepatitis C, presents ambulatory to the ED with cc of gradual onset of a progressively worsening and painful abscess adjacent to her vagina that has been ongoing for the last 3 days. Pt also expresses concern for STD contraction and endorses recent unprotected sex with a partner who has recently experienced STD symptoms and was recently tested and treated empirically for STD infection. She states that she is amenable to empiric treatment for STD here in the ER. Pt denies any history of similar symptoms. She specifically denies nausea, vomiting, fever, chills, CP, or SOB. PCP: None    There are no other complaints, changes, or physical findings at this time.         Past History     Past Medical History:  Past Medical History:   Diagnosis Date    Abnormal Papanicolaou smear of cervix     Back pain     Breast disorder     lump in left breast    Fatigue     Ill-defined condition     Sickle Cell    Joint pain     Liver disease     hep c    Musculoskeletal disorder     Sickle cell trait (HCC)     trait       Past Surgical History:  Past Surgical History:   Procedure Laterality Date     DELIVERY ONLY      c/s in     HX GYN             Family History:  Family History   Problem Relation Age of Onset    Diabetes Maternal Grandmother     Hypertension Maternal Grandmother     Cancer Maternal Grandfather        Social History:  Social History   Substance Use Topics    Smoking status: Current Every Day Smoker Packs/day: 0.50     Years: 8.00    Smokeless tobacco: None    Alcohol use No      Comment: weekends       Allergies:  No Known Allergies      Review of Systems   Review of Systems   Constitutional: Negative for fatigue and fever. HENT: Negative for ear pain and sore throat. Eyes: Negative for pain, redness and visual disturbance. Respiratory: Negative for cough and shortness of breath. Cardiovascular: Negative for chest pain and palpitations. Gastrointestinal: Negative for abdominal pain, nausea and vomiting. Genitourinary: Negative for dysuria, frequency and urgency. Positive for abscess adjacent to the vagina   Musculoskeletal: Negative for back pain, gait problem, neck pain and neck stiffness. Skin: Negative for rash and wound. Neurological: Negative for dizziness, weakness, light-headedness, numbness and headaches. Physical Exam   Physical Exam   Constitutional: She is oriented to person, place, and time. She appears well-developed and well-nourished. Non-toxic appearance. No distress. HENT:   Head: Normocephalic and atraumatic. Right Ear: External ear normal.   Left Ear: External ear normal.   Nose: Nose normal.   Mouth/Throat: Uvula is midline. No trismus in the jaw. Eyes: Conjunctivae and EOM are normal. Pupils are equal, round, and reactive to light. No scleral icterus. Neck: Normal range of motion and full passive range of motion without pain. Cardiovascular: Normal rate and regular rhythm. Pulmonary/Chest: Effort normal. No accessory muscle usage. No tachypnea. No respiratory distress. She has no decreased breath sounds. She has no wheezes. Abdominal: Soft. There is no tenderness. Genitourinary:   Genitourinary Comments: Pelvic Exam Findings: tender pea-sized barthian cyst at R introitus with no redness and minimla tenderness. Vault is empty without discharge. Cervical os is closed. No CMT. Musculoskeletal: Normal range of motion.    Neurological: She is alert and oriented to person, place, and time. She is not disoriented. No cranial nerve deficit. GCS eye subscore is 4. GCS verbal subscore is 5. GCS motor subscore is 6. Skin: Skin is intact. No rash noted. Psychiatric: She has a normal mood and affect. Her speech is normal.   Nursing note and vitals reviewed. Diagnostic Study Results     Labs -     Recent Results (from the past 12 hour(s))   URINALYSIS W/ REFLEX CULTURE    Collection Time: 04/16/18 10:02 PM   Result Value Ref Range    Color YELLOW/STRAW      Appearance CLOUDY (A) CLEAR      Specific gravity 1.017 1.003 - 1.030      pH (UA) 5.5 5.0 - 8.0      Protein NEGATIVE  NEG mg/dL    Glucose NEGATIVE  NEG mg/dL    Ketone NEGATIVE  NEG mg/dL    Bilirubin NEGATIVE  NEG      Blood NEGATIVE  NEG      Urobilinogen 1.0 0.2 - 1.0 EU/dL    Nitrites NEGATIVE  NEG      Leukocyte Esterase MODERATE (A) NEG      WBC 5-10 0 - 4 /hpf    RBC 0-5 0 - 5 /hpf    Epithelial cells FEW FEW /lpf    Bacteria NEGATIVE  NEG /hpf    UA:UC IF INDICATED URINE CULTURE ORDERED (A) CNI     HCG URINE, QL. - POC    Collection Time: 04/16/18 10:17 PM   Result Value Ref Range    Pregnancy test,urine (POC) NEGATIVE  NEG         Radiologic Studies -   No orders to display     CT Results  (Last 48 hours)    None        CXR Results  (Last 48 hours)    None            Medical Decision Making   I am the first provider for this patient. I reviewed the vital signs, available nursing notes, past medical history, past surgical history, family history and social history. Vital Signs-Reviewed the patient's vital signs. Patient Vitals for the past 12 hrs:   Temp Pulse Resp BP SpO2   04/16/18 1734 99 °F (37.2 °C) 77 18 132/77 100 %       Pulse Oximetry Analysis - 100% on RA    Records Reviewed: Nursing Notes and Old Medical Records    Provider Notes (Medical Decision Making):     DDx includes Bartholin's cyst, abscess, STI, BV, candidiasis, PID, UTI, pyelo, other.      Pelvic exam / Marlynn Mitts / wet prep / Sae Cortucker / UA / Brittany Aldridge; treatment based on results and empiric treatment for GC     I&D offered; patient declines in favor of following up with her GYN at the [facility]. In that there is no local evidence of infection and the cyst seems small and that she has little pain, I believe this is safe. ED Course:   Initial assessment performed. The patients presenting problems have been discussed, and they are in agreement with the care plan formulated and outlined with them. I have encouraged them to ask questions as they arise throughout their visit. Procedure Note - Pelvic Exam:    10:09 PM  Performed by: DDx includes chronic pelvic pain, STI, BV, candidiasis, PID, other. Doubt hepatitis, pancreatitis, colitis, cholecystitis or appendicitis based on location and chronicity. Though some of these considerations can be excluded based on history and physical exam, some may require laboratory or other testing. Chaperoned by: Domonique Ash RN  Pelvic exam was performed using bimanual and speculum. Further findings noted in physical exam.   The procedure took 1-15 minutes, and pt tolerated well. Critical Care Time:   0    Disposition:  DISCHARGE NOTE  11:17 PM  The patient has been re-evaluated and is ready for discharge. Reviewed available results with patient. Counseled pt on diagnosis and care plan. Pt has expressed understanding, and all questions have been answered. Pt agrees with plan and agrees to F/U as recommended, or return to the ED if their sxs worsen. Discharge instructions have been provided and explained to the pt, along with reasons to return to the ED. Written by Kika Avendano, ED Scribe, as dictated by Swathi Toney. PLAN:  1. There are no discharge medications for this patient.     2.   Follow-up Information     Follow up With Details Comments Contact Info    Scottie See MD Schedule an appointment as soon as possible for a visit OB / GYN: call tomorrow to schedule follow up [address]  [phone]          Return to ED if worse     Diagnosis     Clinical Impression:   1. Bartholin gland cyst    2. Possible exposure to STD        Attestations: This note is prepared by Dinah Dumas, acting as Scribe for Nuria Tejada. The scribe's documentation has been prepared under my direction and personally reviewed by me in its entirety. I confirm that the note above accurately reflects all work, treatment, procedures, and medical decision making performed by me.   PA-C Glennon Callas

## 2018-04-18 LAB
BACTERIA SPEC CULT: NORMAL
C TRACH DNA SPEC QL NAA+PROBE: NEGATIVE
CC UR VC: NORMAL
N GONORRHOEA DNA SPEC QL NAA+PROBE: POSITIVE
SAMPLE TYPE: ABNORMAL
SERVICE CMNT-IMP: ABNORMAL
SERVICE CMNT-IMP: NORMAL
SPECIMEN SOURCE: ABNORMAL

## 2018-04-18 NOTE — PROGRESS NOTES
Attempted to contact patient to review results. Patient unable to answer phone. Asked to be called back when patient available. Patient treated appropriately while in the ED, but will need to be informed of positive results to alert partners as well.   Maude Vargas PA-C

## 2018-04-19 NOTE — PROGRESS NOTES
the patient called back to the ED to discuss results. Informed of positive gonorrhea test. Pt notes that her significant other was seen and treated in the ED on the same day as well.

## 2018-09-26 ENCOUNTER — HOSPITAL ENCOUNTER (EMERGENCY)
Age: 33
Discharge: HOME | End: 2018-09-26
Attending: EMERGENCY MEDICINE
Payer: MEDICAID

## 2018-09-26 VITALS
OXYGEN SATURATION: 100 % | RESPIRATION RATE: 16 BRPM | HEART RATE: 62 BPM | TEMPERATURE: 98.3 F | SYSTOLIC BLOOD PRESSURE: 140 MMHG | BODY MASS INDEX: 19.27 KG/M2 | DIASTOLIC BLOOD PRESSURE: 88 MMHG | WEIGHT: 122.8 LBS | HEIGHT: 67 IN

## 2018-09-26 DIAGNOSIS — R11.2 NAUSEA AND VOMITING, INTRACTABILITY OF VOMITING NOT SPECIFIED, UNSPECIFIED VOMITING TYPE: Primary | ICD-10-CM

## 2018-09-26 DIAGNOSIS — N39.0 URINARY TRACT INFECTION WITHOUT HEMATURIA, SITE UNSPECIFIED: ICD-10-CM

## 2018-09-26 LAB
ALBUMIN SERPL-MCNC: 3.2 G/DL (ref 3.5–5)
ALBUMIN/GLOB SERPL: 0.7 {RATIO} (ref 1.1–2.2)
ALP SERPL-CCNC: 96 U/L (ref 45–117)
ALT SERPL-CCNC: 16 U/L (ref 12–78)
ANION GAP SERPL CALC-SCNC: 3 MMOL/L (ref 5–15)
APPEARANCE UR: ABNORMAL
AST SERPL-CCNC: 23 U/L (ref 15–37)
BACTERIA URNS QL MICRO: ABNORMAL /HPF
BASOPHILS # BLD: 0.1 K/UL (ref 0–0.1)
BASOPHILS NFR BLD: 1 % (ref 0–1)
BILIRUB SERPL-MCNC: 0.2 MG/DL (ref 0.2–1)
BILIRUB UR QL: NEGATIVE
BUN SERPL-MCNC: 8 MG/DL (ref 6–20)
BUN/CREAT SERPL: 10 (ref 12–20)
CALCIUM SERPL-MCNC: 8.7 MG/DL (ref 8.5–10.1)
CHLORIDE SERPL-SCNC: 105 MMOL/L (ref 97–108)
CO2 SERPL-SCNC: 32 MMOL/L (ref 21–32)
COLOR UR: ABNORMAL
CREAT SERPL-MCNC: 0.77 MG/DL (ref 0.55–1.02)
DIFFERENTIAL METHOD BLD: ABNORMAL
EOSINOPHIL # BLD: 0.1 K/UL (ref 0–0.4)
EOSINOPHIL NFR BLD: 2 % (ref 0–7)
EPITH CASTS URNS QL MICRO: ABNORMAL /LPF
ERYTHROCYTE [DISTWIDTH] IN BLOOD BY AUTOMATED COUNT: 21.4 % (ref 11.5–14.5)
GLOBULIN SER CALC-MCNC: 4.9 G/DL (ref 2–4)
GLUCOSE SERPL-MCNC: 105 MG/DL (ref 65–100)
GLUCOSE UR STRIP.AUTO-MCNC: NEGATIVE MG/DL
HCG UR QL: NEGATIVE
HCT VFR BLD AUTO: 33.4 % (ref 35–47)
HGB BLD-MCNC: 9.7 G/DL (ref 11.5–16)
HGB UR QL STRIP: NEGATIVE
HYALINE CASTS URNS QL MICRO: ABNORMAL /LPF (ref 0–5)
IMM GRANULOCYTES # BLD: 0 K/UL (ref 0–0.04)
IMM GRANULOCYTES NFR BLD AUTO: 0 % (ref 0–0.5)
KETONES UR QL STRIP.AUTO: NEGATIVE MG/DL
LEUKOCYTE ESTERASE UR QL STRIP.AUTO: ABNORMAL
LYMPHOCYTES # BLD: 1.8 K/UL (ref 0.8–3.5)
LYMPHOCYTES NFR BLD: 30 % (ref 12–49)
MCH RBC QN AUTO: 18.7 PG (ref 26–34)
MCHC RBC AUTO-ENTMCNC: 29 G/DL (ref 30–36.5)
MCV RBC AUTO: 64.5 FL (ref 80–99)
MONOCYTES # BLD: 0.7 K/UL (ref 0–1)
MONOCYTES NFR BLD: 12 % (ref 5–13)
NEUTS SEG # BLD: 3.2 K/UL (ref 1.8–8)
NEUTS SEG NFR BLD: 55 % (ref 32–75)
NITRITE UR QL STRIP.AUTO: NEGATIVE
NRBC # BLD: 0 K/UL (ref 0–0.01)
NRBC BLD-RTO: 0 PER 100 WBC
PH UR STRIP: 6.5 [PH] (ref 5–8)
PLATELET # BLD AUTO: 306 K/UL (ref 150–400)
PMV BLD AUTO: 9.2 FL (ref 8.9–12.9)
POTASSIUM SERPL-SCNC: 3.8 MMOL/L (ref 3.5–5.1)
PROT SERPL-MCNC: 8.1 G/DL (ref 6.4–8.2)
PROT UR STRIP-MCNC: NEGATIVE MG/DL
RBC # BLD AUTO: 5.18 M/UL (ref 3.8–5.2)
RBC #/AREA URNS HPF: ABNORMAL /HPF (ref 0–5)
RBC MORPH BLD: ABNORMAL
SODIUM SERPL-SCNC: 140 MMOL/L (ref 136–145)
SP GR UR REFRACTOMETRY: 1.01 (ref 1–1.03)
UROBILINOGEN UR QL STRIP.AUTO: 1 EU/DL (ref 0.2–1)
WBC # BLD AUTO: 5.9 K/UL (ref 3.6–11)
WBC URNS QL MICRO: ABNORMAL /HPF (ref 0–4)

## 2018-09-26 PROCEDURE — 96374 THER/PROPH/DIAG INJ IV PUSH: CPT

## 2018-09-26 PROCEDURE — 81025 URINE PREGNANCY TEST: CPT

## 2018-09-26 PROCEDURE — 36415 COLL VENOUS BLD VENIPUNCTURE: CPT | Performed by: EMERGENCY MEDICINE

## 2018-09-26 PROCEDURE — 85025 COMPLETE CBC W/AUTO DIFF WBC: CPT | Performed by: EMERGENCY MEDICINE

## 2018-09-26 PROCEDURE — 81001 URINALYSIS AUTO W/SCOPE: CPT | Performed by: EMERGENCY MEDICINE

## 2018-09-26 PROCEDURE — 99283 EMERGENCY DEPT VISIT LOW MDM: CPT

## 2018-09-26 PROCEDURE — 80053 COMPREHEN METABOLIC PANEL: CPT | Performed by: EMERGENCY MEDICINE

## 2018-09-26 PROCEDURE — 96361 HYDRATE IV INFUSION ADD-ON: CPT

## 2018-09-26 PROCEDURE — 74011250636 HC RX REV CODE- 250/636: Performed by: EMERGENCY MEDICINE

## 2018-09-26 RX ORDER — SODIUM CHLORIDE 0.9 % (FLUSH) 0.9 %
5-10 SYRINGE (ML) INJECTION EVERY 8 HOURS
Status: DISCONTINUED | OUTPATIENT
Start: 2018-09-26 | End: 2018-09-26 | Stop reason: HOSPADM

## 2018-09-26 RX ORDER — ONDANSETRON 4 MG/1
4 TABLET, ORALLY DISINTEGRATING ORAL
Qty: 10 TAB | Refills: 0 | Status: SHIPPED | OUTPATIENT
Start: 2018-09-26 | End: 2018-11-14

## 2018-09-26 RX ORDER — NITROFURANTOIN 25; 75 MG/1; MG/1
100 CAPSULE ORAL 2 TIMES DAILY
Qty: 14 CAP | Refills: 0 | Status: SHIPPED | OUTPATIENT
Start: 2018-09-26 | End: 2018-10-03

## 2018-09-26 RX ORDER — ONDANSETRON 2 MG/ML
4 INJECTION INTRAMUSCULAR; INTRAVENOUS
Status: COMPLETED | OUTPATIENT
Start: 2018-09-26 | End: 2018-09-26

## 2018-09-26 RX ORDER — SODIUM CHLORIDE 0.9 % (FLUSH) 0.9 %
5-10 SYRINGE (ML) INJECTION AS NEEDED
Status: DISCONTINUED | OUTPATIENT
Start: 2018-09-26 | End: 2018-09-26 | Stop reason: HOSPADM

## 2018-09-26 RX ADMIN — SODIUM CHLORIDE 1000 ML: 900 INJECTION, SOLUTION INTRAVENOUS at 02:39

## 2018-09-26 RX ADMIN — ONDANSETRON 4 MG: 2 INJECTION INTRAMUSCULAR; INTRAVENOUS at 02:38

## 2018-09-26 NOTE — LETTER
[facility] EMERGENCY DEPT 
[address] [address] [phone] Work/School Note Date: 9/26/2018 To Whom It May concern: 
 
González Sofia was seen and treated today in the emergency room by the following provider(s): 
Attending Provider: Pravin Gracia MD. González Sofia should be excused from work on 9/26/2018. Sincerely, Pravin Gracia MD

## 2018-09-26 NOTE — ED PROVIDER NOTES
EMERGENCY DEPARTMENT HISTORY AND PHYSICAL EXAM 
     
 
Date: 9/26/2018 Patient Name: Maya Carrillo History of Presenting Illness Chief Complaint Patient presents with  Vomiting  
  ambulatory into triage; pt reports for \"about 3 weeks I haven't been able to keep food down\" pt presents to triage eating CHIPS; pt states \"I don't have energy\"  Abdominal Pain  
  intermittent  Headache \"I guess from not eating\" History Provided By: Patient HPI: Maya Carrillo is a 28 y.o. female who presents ambulatory to the ED c/o constant decreased appetite. She reports additional nausea and generalized malaise. Pt denies any modifying factors. She denies any hx of similar sxs. Pt denies any recent follow up with a PCP or her OB specialist. She denies any recent medications for her current sxs. Pt denies use of OCPs or hormone replacements. She denies any possibility of pregnancy at this time. Pt denies any recent sick contacts, stating she currently works in [employer] and might have eaten something bad at work. She otherwise specifically denies any recent vaginal discharge, fever, chills, vomiting, diarrhea, abd pain, CP, SOB, lightheadedness, dizziness, numbness, weakness, tingling, BLE swelling, HA, heart palpitations, urinary sxs, changes in BM, changes in PO intake, melena, hematochezia, cough, or congestion. OB/GYN: Dr. Aaron [facility]) Allergies: NKDA Social Hx: +tobacco (0.5 ppd), +EtOH (socially), +Illicit Drugs (heroin / cocaine / inhalants) There are no other complaints, changes, or physical findings at this time. Current Facility-Administered Medications Medication Dose Route Frequency Provider Last Rate Last Dose  sodium chloride (NS) flush 5-10 mL  5-10 mL IntraVENous Q8H Diana Clark MD      
 sodium chloride (NS) flush 5-10 mL  5-10 mL IntraVENous PRN Diana Clark MD      
 
Current Outpatient Prescriptions Medication Sig Dispense Refill  ondansetron (ZOFRAN ODT) 4 mg disintegrating tablet Take 1 Tab by mouth every eight (8) hours as needed for Nausea. 10 Tab 0  
 nitrofurantoin, macrocrystal-monohydrate, (MACROBID) 100 mg capsule Take 1 Cap by mouth two (2) times a day for 7 days. 14 Cap 0 Past History Past Medical History: 
Past Medical History:  
Diagnosis Date  Abnormal Papanicolaou smear of cervix  Back pain  Breast disorder   
 lump in left breast  
 Fatigue  Ill-defined condition Sickle Cell  Joint pain  Liver disease   
 hep c  
 Musculoskeletal disorder  Sickle cell trait ([facility] [address]   
 trait Past Surgical History: 
Past Surgical History:  
Procedure Laterality Date   DELIVERY ONLY    
 c/s in   HX GYN    
  Family History: 
Family History Problem Relation Age of Onset  Diabetes Maternal Grandmother  Hypertension Maternal Grandmother  Cancer Maternal Grandfather Social History: 
Social History Substance Use Topics  Smoking status: Current Every Day Smoker Packs/day: 0.50 Years: 8.00  Smokeless tobacco: Not on file  Alcohol use No  
   Comment: weekends Allergies: 
No Known Allergies Review of Systems Review of Systems Constitutional: Positive for appetite change. Negative for chills and fever. HENT: Negative for congestion, ear pain, rhinorrhea and sore throat. Respiratory: Negative for cough and shortness of breath. Cardiovascular: Negative for chest pain, palpitations and leg swelling. Gastrointestinal: Positive for nausea. Negative for constipation and diarrhea. No melena No hematochezia Endocrine: Negative for polyuria. Genitourinary: Negative for dysuria, frequency and hematuria. Neurological: Negative for dizziness, weakness, light-headedness, numbness and headaches. No tingling All other systems reviewed and are negative. Physical Exam  
Physical Exam  
Nursing note and vitals reviewed. General appearance - well nourished, thin appearing, and in no distress Eyes - pupils equal and reactive, extraocular eye movements intact ENT - mucous membranes moist, pharynx normal without lesions Neck - supple, no significant adenopathy; non-tender to palpation Chest - clear to auscultation, no wheezes, rales or rhonchi; non-tender to palpation Heart - normal rate and regular rhythm, S1 and S2 normal, no murmurs noted Abdomen - soft, nontender, nondistended, no masses or organomegaly Musculoskeletal - no joint tenderness, deformity or swelling; normal ROM Extremities - peripheral pulses normal, no pedal edema Skin - normal coloration and turgor, no rashes Neurological - alert, oriented x3, normal speech, no focal findings or movement disorder noted Written by Andria Tejada ED Scribe, as dictated by Rico Anna MD 
 
 
Diagnostic Study Results Labs - Recent Results (from the past 12 hour(s)) CBC WITH AUTOMATED DIFF Collection Time: 09/26/18  1:45 AM  
Result Value Ref Range WBC 5.9 3.6 - 11.0 K/uL  
 RBC 5.18 3.80 - 5.20 M/uL HGB 9.7 (L) 11.5 - 16.0 g/dL HCT 33.4 (L) 35.0 - 47.0 % MCV 64.5 (L) 80.0 - 99.0 FL  
 MCH 18.7 (L) 26.0 - 34.0 PG  
 MCHC 29.0 (L) 30.0 - 36.5 g/dL RDW 21.4 (H) 11.5 - 14.5 % PLATELET 235 163 - 478 K/uL MPV 9.2 8.9 - 12.9 FL  
 NRBC 0.0 0  WBC ABSOLUTE NRBC 0.00 0.00 - 0.01 K/uL NEUTROPHILS 55 32 - 75 % LYMPHOCYTES 30 12 - 49 % MONOCYTES 12 5 - 13 % EOSINOPHILS 2 0 - 7 % BASOPHILS 1 0 - 1 % IMMATURE GRANULOCYTES 0 0.0 - 0.5 % ABS. NEUTROPHILS 3.2 1.8 - 8.0 K/UL  
 ABS. LYMPHOCYTES 1.8 0.8 - 3.5 K/UL  
 ABS. MONOCYTES 0.7 0.0 - 1.0 K/UL  
 ABS. EOSINOPHILS 0.1 0.0 - 0.4 K/UL  
 ABS. BASOPHILS 0.1 0.0 - 0.1 K/UL  
 ABS. IMM. GRANS. 0.0 0.00 - 0.04 K/UL  
 DF AUTOMATED    
 RBC COMMENTS ANISOCYTOSIS 2+ 
    
 RBC COMMENTS HYPOCHROMIA 2+ 
 RBC COMMENTS MICROCYTOSIS 
3+ METABOLIC PANEL, COMPREHENSIVE Collection Time: 09/26/18  1:45 AM  
Result Value Ref Range Sodium 140 136 - 145 mmol/L Potassium 3.8 3.5 - 5.1 mmol/L Chloride 105 97 - 108 mmol/L  
 CO2 32 21 - 32 mmol/L Anion gap 3 (L) 5 - 15 mmol/L Glucose 105 (H) 65 - 100 mg/dL BUN 8 6 - 20 MG/DL Creatinine 0.77 0.55 - 1.02 MG/DL  
 BUN/Creatinine ratio 10 (L) 12 - 20 GFR est AA >60 >60 ml/min/1.73m2 GFR est non-AA >60 >60 ml/min/1.73m2 Calcium 8.7 8.5 - 10.1 MG/DL Bilirubin, total 0.2 0.2 - 1.0 MG/DL  
 ALT (SGPT) 16 12 - 78 U/L  
 AST (SGOT) 23 15 - 37 U/L Alk. phosphatase 96 45 - 117 U/L Protein, total 8.1 6.4 - 8.2 g/dL Albumin 3.2 (L) 3.5 - 5.0 g/dL Globulin 4.9 (H) 2.0 - 4.0 g/dL A-G Ratio 0.7 (L) 1.1 - 2.2 HCG URINE, QL. - POC Collection Time: 09/26/18  2:27 AM  
Result Value Ref Range Pregnancy test,urine (POC) NEGATIVE  NEG    
URINALYSIS W/ RFLX MICROSCOPIC Collection Time: 09/26/18  2:28 AM  
Result Value Ref Range Color YELLOW/STRAW Appearance CLOUDY (A) CLEAR Specific gravity 1.015 1.003 - 1.030    
 pH (UA) 6.5 5.0 - 8.0 Protein NEGATIVE  NEG mg/dL Glucose NEGATIVE  NEG mg/dL Ketone NEGATIVE  NEG mg/dL Bilirubin NEGATIVE  NEG Blood NEGATIVE  NEG Urobilinogen 1.0 0.2 - 1.0 EU/dL Nitrites NEGATIVE  NEG Leukocyte Esterase TRACE (A) NEG    
 WBC 0-4 0 - 4 /hpf  
 RBC 0-5 0 - 5 /hpf Epithelial cells FEW FEW /lpf Bacteria 1+ (A) NEG /hpf Hyaline cast 0-2 0 - 5 /lpf Radiologic Studies - No orders to display CT Results  (Last 48 hours) None CXR Results  (Last 48 hours) None Medical Decision Making I am the first provider for this patient. I reviewed the vital signs, available nursing notes, past medical history, past surgical history, family history and social history. Vital Signs-Reviewed the patient's vital signs. Patient Vitals for the past 12 hrs: 
 Temp Pulse Resp BP SpO2  
09/26/18 0014 98.3 °F (36.8 °C) 62 16 140/88 100 % Records Reviewed: Nursing Notes and Old Medical Records Provider Notes (Medical Decision Making): DDx: UTI, pregnancy, viral syndrome ED Course:  
Initial assessment performed. The patients presenting problems have been discussed, and they are in agreement with the care plan formulated and outlined with them. I have encouraged them to ask questions as they arise throughout their visit. 2:15 AM 
Spent 3-5 minutes discussing the risks of smoking and the benefits of smoking cessation as well as the long term sequelae of smoking with the pt who verbalized her understanding. Reviewed strategies for success, including gradually decreasing the number of cigarettes smoked a day. Written by Ginger Mendosa, ED Scribe, as dictated by Diana Clark MD  
 
Progress note: 
4:18 AM 
Pt noted to be feeling better, pt sleeping comfortably, ready for discharge. Updated pt and/or family on all final lab findings. Will follow up as instructed. All questions have been answered, pt voiced understanding and agreement with plan. Abx were prescribed, pt advised that diarrhea and rash are possible side effects of the medications. Specific return precautions provided as well as instructions to return to the ED should sx worsen at any time. Vital signs stable for discharge. Written by Ginger Mendosa, ED Scribe, as dictated by Juan Stone MD 
 
Diagnosis Clinical Impression: 1. Nausea and vomiting, intractability of vomiting not specified, unspecified vomiting type 2. Urinary tract infection without hematuria, site unspecified PLAN: 
1. Current Discharge Medication List  
  
START taking these medications Details  
ondansetron (ZOFRAN ODT) 4 mg disintegrating tablet Take 1 Tab by mouth every eight (8) hours as needed for Nausea. Qty: 10 Tab, Refills: 0 nitrofurantoin, macrocrystal-monohydrate, (MACROBID) 100 mg capsule Take 1 Cap by mouth two (2) times a day for 7 days. Qty: 14 Cap, Refills: 0  
  
  
 
2. Follow-up Information Follow up With Details Comments Contact Info [facility] EMERGENCY DEPT  If symptoms worsen Arsh Mensah [facility] 
[phone] Return to ED if worse Disposition: 
 
DISCHARGE NOTE: 
4:19 AM 
The patient's results have been reviewed with family and/or caregiver. They verbally convey their understanding and agreement of the patient's signs, symptoms, diagnosis, treatment, and prognosis. They additionally agree to follow up as recommended in the discharge instructions or to return to the Emergency Room should the patient's condition change prior to their follow-up appointment. The family and/or caregiver verbally agrees with the care-plan and all of their questions have been answered. The discharge instructions have also been provided to the them along with educational information regarding the patient's diagnosis and a list of reasons why the patient would want to return to the ER prior to their follow-up appointment should their condition change. Written by Jamar Noland, ED Scribe, as dictated by Jr Melgoza MD. Attestations: This note is prepared by Jamar Noland, acting as Scribe for MD Diana Curarn MD : The scribe's documentation has been prepared under my direction and personally reviewed by me in its entirety. I confirm that the note above accurately reflects all work, treatment, procedures, and medical decision making performed by me. This note will not be viewable in [address]

## 2018-09-26 NOTE — DISCHARGE INSTRUCTIONS
Urinary Tract Infection in Women: Care Instructions  Your Care Instructions    A urinary tract infection, or UTI, is a general term for an infection anywhere between the kidneys and the urethra (where urine comes out). Most UTIs are bladder infections. They often cause pain or burning when you urinate. UTIs are caused by bacteria and can be cured with antibiotics. Be sure to complete your treatment so that the infection goes away. Follow-up care is a key part of your treatment and safety. Be sure to make and go to all appointments, and call your doctor if you are having problems. It's also a good idea to know your test results and keep a list of the medicines you take. How can you care for yourself at home? · Take your antibiotics as directed. Do not stop taking them just because you feel better. You need to take the full course of antibiotics. · Drink extra water and other fluids for the next day or two. This may help wash out the bacteria that are causing the infection. (If you have kidney, heart, or liver disease and have to limit fluids, talk with your doctor before you increase your fluid intake.)  · Avoid drinks that are carbonated or have caffeine. They can irritate the bladder. · Urinate often. Try to empty your bladder each time. · To relieve pain, take a hot bath or lay a heating pad set on low over your lower belly or genital area. Never go to sleep with a heating pad in place. To prevent UTIs  · Drink plenty of water each day. This helps you urinate often, which clears bacteria from your system. (If you have kidney, heart, or liver disease and have to limit fluids, talk with your doctor before you increase your fluid intake.)  · Urinate when you need to. · Urinate right after you have sex. · Change sanitary pads often. · Avoid douches, bubble baths, feminine hygiene sprays, and other feminine hygiene products that have deodorants.   · After going to the bathroom, wipe from front to back.  When should you call for help? Call your doctor now or seek immediate medical care if:    · Symptoms such as fever, chills, nausea, or vomiting get worse or appear for the first time.     · You have new pain in your back just below your rib cage. This is called flank pain.     · There is new blood or pus in your urine.     · You have any problems with your antibiotic medicine.    Watch closely for changes in your health, and be sure to contact your doctor if:    · You are not getting better after taking an antibiotic for 2 days.     · Your symptoms go away but then come back. Where can you learn more? Go to http://dav-marcella.info/. Enter Q827 in the search box to learn more about \"Urinary Tract Infection in Women: Care Instructions. \"  Current as of: May 12, 2017  Content Version: 11.7  © 3522-9384 [employer]. Care instructions adapted under license by [employer] (which disclaims liability or warranty for this information). If you have questions about a medical condition or this instruction, always ask your healthcare professional. [place] [address] any warranty or liability for your use of this information. Nausea and Vomiting: Care Instructions  Your Care Instructions    When you are nauseated, you may feel weak and sweaty and notice a lot of saliva in your mouth. Nausea often leads to vomiting. Most of the time you do not need to worry about nausea and vomiting, but they can be signs of other illnesses. Two common causes of nausea and vomiting are stomach flu and food poisoning. Nausea and vomiting from viral stomach flu will usually start to improve within 24 hours. Nausea and vomiting from food poisoning may last from 12 to 48 hours. The doctor has checked you carefully, but problems can develop later. If you notice any problems or new symptoms, get medical treatment right away.   Follow-up care is a key part of your treatment and safety. Be sure to make and go to all appointments, and call your doctor if you are having problems. It's also a good idea to know your test results and keep a list of the medicines you take. How can you care for yourself at home? · To prevent dehydration, drink plenty of fluids, enough so that your urine is light yellow or clear like water. Choose water and other caffeine-free clear liquids until you feel better. If you have kidney, heart, or liver disease and have to limit fluids, talk with your doctor before you increase the amount of fluids you drink. · Rest in bed until you feel better. · When you are able to eat, try clear soups, mild foods, and liquids until all symptoms are gone for 12 to 48 hours. Other good choices include dry toast, crackers, cooked cereal, and gelatin dessert, such as Jell-O. When should you call for help? Call 911 anytime you think you may need emergency care. For example, call if:    · You passed out (lost consciousness).    Call your doctor now or seek immediate medical care if:    · You have symptoms of dehydration, such as:  ¨ Dry eyes and a dry mouth. ¨ Passing only a little dark urine. ¨ Feeling thirstier than usual.     · You have new or worsening belly pain.     · You have a new or higher fever.     · You vomit blood or what looks like coffee grounds.    Watch closely for changes in your health, and be sure to contact your doctor if:    · You have ongoing nausea and vomiting.     · Your vomiting is getting worse.     · Your vomiting lasts longer than 2 days.     · You are not getting better as expected. Where can you learn more? Go to http://dav-marcella.info/. Enter 25 [id number] in the search box to learn more about \"Nausea and Vomiting: Care Instructions. \"  Current as of: November 20, 2017  Content Version: 11.7  © 4843-1213 [employer], [employer].  Care instructions adapted under license by [employer] (which disclaims liability or warranty for this information). If you have questions about a medical condition or this instruction, always ask your healthcare professional. [place] [address] any warranty or liability for your use of this information.

## 2018-11-14 ENCOUNTER — HOSPITAL ENCOUNTER (EMERGENCY)
Age: 33
Discharge: OTHER INSTITUTION | End: 2018-11-14
Attending: EMERGENCY MEDICINE
Payer: MEDICAID

## 2018-11-14 ENCOUNTER — HOSPITAL ENCOUNTER (INPATIENT)
Age: 33
LOS: 5 days | Discharge: HOME | DRG: 751 | End: 2018-11-19
Attending: PSYCHIATRY & NEUROLOGY | Admitting: PSYCHIATRY & NEUROLOGY
Payer: MEDICAID

## 2018-11-14 VITALS
TEMPERATURE: 97.8 F | WEIGHT: 116.18 LBS | OXYGEN SATURATION: 100 % | BODY MASS INDEX: 18.24 KG/M2 | HEIGHT: 67 IN | SYSTOLIC BLOOD PRESSURE: 148 MMHG | RESPIRATION RATE: 13 BRPM | HEART RATE: 75 BPM | DIASTOLIC BLOOD PRESSURE: 95 MMHG

## 2018-11-14 DIAGNOSIS — F11.23 OPIOID DEPENDENCE WITH WITHDRAWAL (HCC): Primary | ICD-10-CM

## 2018-11-14 DIAGNOSIS — F33.1 MODERATE EPISODE OF RECURRENT MAJOR DEPRESSIVE DISORDER (HCC): ICD-10-CM

## 2018-11-14 LAB
ALBUMIN SERPL-MCNC: 3.6 G/DL (ref 3.5–5)
ALBUMIN/GLOB SERPL: 0.7 {RATIO} (ref 1.1–2.2)
ALP SERPL-CCNC: 116 U/L (ref 45–117)
ALT SERPL-CCNC: 18 U/L (ref 12–78)
AMPHET UR QL SCN: NEGATIVE
ANION GAP SERPL CALC-SCNC: 6 MMOL/L (ref 5–15)
APAP SERPL-MCNC: <2 UG/ML (ref 10–30)
APPEARANCE UR: CLEAR
AST SERPL-CCNC: 27 U/L (ref 15–37)
ATRIAL RATE: 67 BPM
BACTERIA URNS QL MICRO: NEGATIVE /HPF
BARBITURATES UR QL SCN: NEGATIVE
BASOPHILS # BLD: 0 K/UL (ref 0–0.1)
BASOPHILS NFR BLD: 0 % (ref 0–1)
BENZODIAZ UR QL: NEGATIVE
BILIRUB SERPL-MCNC: 0.3 MG/DL (ref 0.2–1)
BILIRUB UR QL: NEGATIVE
BUN SERPL-MCNC: 9 MG/DL (ref 6–20)
BUN/CREAT SERPL: 12 (ref 12–20)
CALCIUM SERPL-MCNC: 9 MG/DL (ref 8.5–10.1)
CALCULATED P AXIS, ECG09: 64 DEGREES
CALCULATED R AXIS, ECG10: 66 DEGREES
CALCULATED T AXIS, ECG11: 43 DEGREES
CANNABINOIDS UR QL SCN: POSITIVE
CHLORIDE SERPL-SCNC: 104 MMOL/L (ref 97–108)
CO2 SERPL-SCNC: 27 MMOL/L (ref 21–32)
COCAINE UR QL SCN: POSITIVE
COLOR UR: ABNORMAL
CREAT SERPL-MCNC: 0.74 MG/DL (ref 0.55–1.02)
DIAGNOSIS, 93000: NORMAL
DIFFERENTIAL METHOD BLD: ABNORMAL
DRUG SCRN COMMENT,DRGCM: ABNORMAL
EOSINOPHIL # BLD: 0 K/UL (ref 0–0.4)
EOSINOPHIL NFR BLD: 0 % (ref 0–7)
EPITH CASTS URNS QL MICRO: ABNORMAL /LPF
ERYTHROCYTE [DISTWIDTH] IN BLOOD BY AUTOMATED COUNT: 20.8 % (ref 11.5–14.5)
ETHANOL SERPL-MCNC: <10 MG/DL
GLOBULIN SER CALC-MCNC: 5.2 G/DL (ref 2–4)
GLUCOSE SERPL-MCNC: 99 MG/DL (ref 65–100)
GLUCOSE UR STRIP.AUTO-MCNC: NEGATIVE MG/DL
HCT VFR BLD AUTO: 34.2 % (ref 35–47)
HGB BLD-MCNC: 10.1 G/DL (ref 11.5–16)
HGB UR QL STRIP: ABNORMAL
HYALINE CASTS URNS QL MICRO: ABNORMAL /LPF (ref 0–5)
IMM GRANULOCYTES # BLD: 0 K/UL (ref 0–0.04)
IMM GRANULOCYTES NFR BLD AUTO: 0 % (ref 0–0.5)
KETONES UR QL STRIP.AUTO: NEGATIVE MG/DL
LEUKOCYTE ESTERASE UR QL STRIP.AUTO: NEGATIVE
LYMPHOCYTES # BLD: 1.1 K/UL (ref 0.8–3.5)
LYMPHOCYTES NFR BLD: 15 % (ref 12–49)
MCH RBC QN AUTO: 18.8 PG (ref 26–34)
MCHC RBC AUTO-ENTMCNC: 29.5 G/DL (ref 30–36.5)
MCV RBC AUTO: 63.6 FL (ref 80–99)
METHADONE UR QL: NEGATIVE
MONOCYTES # BLD: 0.4 K/UL (ref 0–1)
MONOCYTES NFR BLD: 6 % (ref 5–13)
NEUTS SEG # BLD: 5.6 K/UL (ref 1.8–8)
NEUTS SEG NFR BLD: 79 % (ref 32–75)
NITRITE UR QL STRIP.AUTO: NEGATIVE
NRBC # BLD: 0 K/UL (ref 0–0.01)
NRBC BLD-RTO: 0 PER 100 WBC
OPIATES UR QL: POSITIVE
P-R INTERVAL, ECG05: 138 MS
PCP UR QL: NEGATIVE
PH UR STRIP: 8 [PH] (ref 5–8)
PLATELET # BLD AUTO: 300 K/UL (ref 150–400)
POTASSIUM SERPL-SCNC: 4 MMOL/L (ref 3.5–5.1)
PROT SERPL-MCNC: 8.8 G/DL (ref 6.4–8.2)
PROT UR STRIP-MCNC: NEGATIVE MG/DL
Q-T INTERVAL, ECG07: 394 MS
QRS DURATION, ECG06: 90 MS
QTC CALCULATION (BEZET), ECG08: 416 MS
RBC # BLD AUTO: 5.38 M/UL (ref 3.8–5.2)
RBC #/AREA URNS HPF: ABNORMAL /HPF (ref 0–5)
RBC MORPH BLD: ABNORMAL
SALICYLATES SERPL-MCNC: 2.3 MG/DL (ref 2.8–20)
SODIUM SERPL-SCNC: 137 MMOL/L (ref 136–145)
SP GR UR REFRACTOMETRY: 1.01 (ref 1–1.03)
TROPONIN I SERPL-MCNC: <0.05 NG/ML
UROBILINOGEN UR QL STRIP.AUTO: 1 EU/DL (ref 0.2–1)
VENTRICULAR RATE, ECG03: 67 BPM
WBC # BLD AUTO: 7.1 K/UL (ref 3.6–11)
WBC URNS QL MICRO: ABNORMAL /HPF (ref 0–4)

## 2018-11-14 PROCEDURE — 74011250637 HC RX REV CODE- 250/637: Performed by: PSYCHIATRY & NEUROLOGY

## 2018-11-14 PROCEDURE — 81001 URINALYSIS AUTO W/SCOPE: CPT

## 2018-11-14 PROCEDURE — 65220000003 HC RM SEMIPRIVATE PSYCH

## 2018-11-14 PROCEDURE — 84484 ASSAY OF TROPONIN QUANT: CPT

## 2018-11-14 PROCEDURE — 90791 PSYCH DIAGNOSTIC EVALUATION: CPT

## 2018-11-14 PROCEDURE — 80053 COMPREHEN METABOLIC PANEL: CPT

## 2018-11-14 PROCEDURE — 93005 ELECTROCARDIOGRAM TRACING: CPT

## 2018-11-14 PROCEDURE — 99285 EMERGENCY DEPT VISIT HI MDM: CPT

## 2018-11-14 PROCEDURE — 36415 COLL VENOUS BLD VENIPUNCTURE: CPT

## 2018-11-14 PROCEDURE — 85025 COMPLETE CBC W/AUTO DIFF WBC: CPT

## 2018-11-14 PROCEDURE — 80307 DRUG TEST PRSMV CHEM ANLYZR: CPT

## 2018-11-14 RX ORDER — LORAZEPAM 1 MG/1
4 TABLET ORAL
Status: DISCONTINUED | OUTPATIENT
Start: 2018-11-14 | End: 2018-11-19 | Stop reason: HOSPADM

## 2018-11-14 RX ORDER — LORAZEPAM 1 MG/1
2 TABLET ORAL
Status: DISCONTINUED | OUTPATIENT
Start: 2018-11-14 | End: 2018-11-19 | Stop reason: HOSPADM

## 2018-11-14 RX ORDER — IBUPROFEN 200 MG
1 TABLET ORAL
Status: DISCONTINUED | OUTPATIENT
Start: 2018-11-14 | End: 2018-11-19 | Stop reason: HOSPADM

## 2018-11-14 RX ORDER — ACETAMINOPHEN 325 MG/1
650 TABLET ORAL
Status: DISCONTINUED | OUTPATIENT
Start: 2018-11-14 | End: 2018-11-19 | Stop reason: HOSPADM

## 2018-11-14 RX ORDER — ADHESIVE BANDAGE
30 BANDAGE TOPICAL DAILY PRN
Status: DISCONTINUED | OUTPATIENT
Start: 2018-11-14 | End: 2018-11-19 | Stop reason: HOSPADM

## 2018-11-14 RX ORDER — BENZTROPINE MESYLATE 2 MG/1
2 TABLET ORAL
Status: DISCONTINUED | OUTPATIENT
Start: 2018-11-14 | End: 2018-11-19 | Stop reason: HOSPADM

## 2018-11-14 RX ORDER — LORAZEPAM 1 MG/1
1 TABLET ORAL
Status: DISCONTINUED | OUTPATIENT
Start: 2018-11-14 | End: 2018-11-15

## 2018-11-14 RX ORDER — LORAZEPAM 2 MG/ML
2 INJECTION INTRAMUSCULAR
Status: DISCONTINUED | OUTPATIENT
Start: 2018-11-14 | End: 2018-11-15

## 2018-11-14 RX ORDER — SODIUM CHLORIDE 0.9 % (FLUSH) 0.9 %
5-10 SYRINGE (ML) INJECTION EVERY 8 HOURS
Status: DISCONTINUED | OUTPATIENT
Start: 2018-11-14 | End: 2018-11-14 | Stop reason: HOSPADM

## 2018-11-14 RX ORDER — SODIUM CHLORIDE 0.9 % (FLUSH) 0.9 %
5-10 SYRINGE (ML) INJECTION AS NEEDED
Status: DISCONTINUED | OUTPATIENT
Start: 2018-11-14 | End: 2018-11-14 | Stop reason: HOSPADM

## 2018-11-14 RX ORDER — IBUPROFEN 400 MG/1
400 TABLET ORAL
Status: DISCONTINUED | OUTPATIENT
Start: 2018-11-14 | End: 2018-11-19 | Stop reason: HOSPADM

## 2018-11-14 RX ORDER — BENZTROPINE MESYLATE 1 MG/ML
2 INJECTION INTRAMUSCULAR; INTRAVENOUS
Status: DISCONTINUED | OUTPATIENT
Start: 2018-11-14 | End: 2018-11-19 | Stop reason: HOSPADM

## 2018-11-14 RX ORDER — ZOLPIDEM TARTRATE 10 MG/1
10 TABLET ORAL
Status: DISCONTINUED | OUTPATIENT
Start: 2018-11-14 | End: 2018-11-19 | Stop reason: HOSPADM

## 2018-11-14 RX ORDER — LANOLIN ALCOHOL/MO/W.PET/CERES
100 CREAM (GRAM) TOPICAL DAILY
Status: DISCONTINUED | OUTPATIENT
Start: 2018-11-14 | End: 2018-11-19 | Stop reason: HOSPADM

## 2018-11-14 RX ORDER — OLANZAPINE 5 MG/1
5 TABLET ORAL
Status: DISCONTINUED | OUTPATIENT
Start: 2018-11-14 | End: 2018-11-19 | Stop reason: HOSPADM

## 2018-11-14 RX ORDER — FOLIC ACID 1 MG/1
1 TABLET ORAL DAILY
Status: DISCONTINUED | OUTPATIENT
Start: 2018-11-14 | End: 2018-11-19 | Stop reason: HOSPADM

## 2018-11-14 RX ORDER — POLYETHYLENE GLYCOL 3350 17 G/17G
17 POWDER, FOR SOLUTION ORAL DAILY
Status: DISCONTINUED | OUTPATIENT
Start: 2018-11-15 | End: 2018-11-19 | Stop reason: HOSPADM

## 2018-11-14 RX ORDER — FOLIC ACID 1 MG/1
1 TABLET ORAL DAILY
Status: DISCONTINUED | OUTPATIENT
Start: 2018-11-14 | End: 2018-11-14

## 2018-11-14 RX ADMIN — ZOLPIDEM TARTRATE 10 MG: 10 TABLET ORAL at 21:42

## 2018-11-14 RX ADMIN — Medication 100 MG: at 21:42

## 2018-11-14 RX ADMIN — MULTIPLE VITAMINS W/ MINERALS TAB 1 TABLET: TAB at 21:42

## 2018-11-14 RX ADMIN — FOLIC ACID 1 MG: 1 TABLET ORAL at 21:42

## 2018-11-14 NOTE — BH NOTES
Pt arrived to behavioral health general unit at  accompanied by the hospital security and transport. Pt came to unit via stretcher. Pt admitted for SI and stating she doesn't want to live anymore. Pt tried to overdose on heroin and benadryl. She also stated that she wanted to hurt someone with no target. Pt was compliant with the body search and admission process. Her cell phone, money, and keys were sent to security. Pt has a flat effect and was guarded throughout the assessment. Pt stated she was hearing voices. They were unknown voices and were telling her to kill herself. Pt had a depressed mood and was very to the point with her answers to questions. Denied current and past abuse. Pt did state she is a qday drinker and drug abuser. Pt stated her drug of choice is Heroin, but uses opiates and cocaine. All of these have been used either this morning or yesterday. Pt stated she has used these drugs hard for the past 2 years. Pt states she has a 15year old daughter. In report I was informed that her daughter is staying with the patients mother while she is here. Pt was given a tour of the unit, room orientation, and treatment team process. Pt was given the opportunity for questions and they were answered appropriately.

## 2018-11-14 NOTE — H&P
History and Physical    Subjective:     Evangelist Nayak is a 35 y.o. female with past medical hx significant for sickle cell trait, Hep C, lump in breast.    Pt is hospitalized with principal diagnosis of Depression. Showing no signs of acute distress. Appears young and healthy. Past Medical History:   Diagnosis Date    Abnormal Papanicolaou smear of cervix     Back pain     Breast disorder     lump in left breast    Fatigue     Ill-defined condition     Sickle Cell    Joint pain     Liver disease     hep c    Musculoskeletal disorder     Sickle cell trait (HCC)     trait      Past Surgical History:   Procedure Laterality Date     DELIVERY ONLY      c/s in     HX GYN           Family History   Problem Relation Age of Onset    Diabetes Maternal Grandmother     Hypertension Maternal Grandmother     Cancer Maternal Grandfather       Social History     Tobacco Use    Smoking status: Current Every Day Smoker     Packs/day: 0.50     Years: 8.00     Pack years: 4.00    Smokeless tobacco: Never Used   Substance Use Topics    Alcohol use: No     Alcohol/week: 3.5 oz     Types: 7 Cans of beer per week     Comment: weekends       Prior to Admission medications    Not on File     No Known Allergies     Review of Systems:  Constitutional: negative  Eyes: negative  Ears, Nose, Mouth, Throat, and Face: negative  Respiratory: negative  Cardiovascular: negative  Gastrointestinal: negative  Genitourinary:negative  Integument/Breast: negative  Hematologic/Lymphatic: negative  Musculoskeletal:negative  Neurological: negative  Behavioral/Psychiatric: Depression, Etoh abuse. Endocrine: negative  Allergic/Immunologic: negative     Objective: Intake and Output:    No intake/output data recorded. No intake/output data recorded. Physical Exam:   Visit Vitals  LMP 2018   Breastfeeding? No     General:  Alert, cooperative, no distress, appears stated age.    Head:  Normocephalic, without obvious abnormality, atraumatic. Eyes:  Conjunctivae/corneas clear. PERRL, EOMs intact. Ears:  Normal external ear canals both ears. Nose: Nares normal. Septum midline. Mucosa normal. No drainage or sinus tenderness. Throat: Lips, mucosa, and tongue normal. Teeth and gums normal.   Neck: Supple, symmetrical, trachea midline, no adenopathy, thyroid: no enlargement/tenderness/nodules. Back:   Symmetric, no curvature. ROM normal. No CVA tenderness. Lungs:   Clear to auscultation bilaterally. Chest wall:  No tenderness or deformity. Heart:  Regular rate and rhythm, S1, S2 normal, no murmur, click, rub or gallop. Abdomen:   Soft, non-tender. Bowel sounds normal. No masses,  No organomegaly. Extremities: Extremities normal, atraumatic, no cyanosis or edema. Pulses: Distal pulses intact. Skin: Skin color, texture, turgor normal. No rashes or lesions   Lymph nodes: No cervical or supraclavicular adenopathy. .   Neurologic: CNII-XII intact. Normal strength, sensation intact, reflexes 2/4 patellar region. Data Review:   Recent Results (from the past 24 hour(s))   CBC WITH AUTOMATED DIFF    Collection Time: 11/14/18 10:23 AM   Result Value Ref Range    WBC 7.1 3.6 - 11.0 K/uL    RBC 5.38 (H) 3.80 - 5.20 M/uL    HGB 10.1 (L) 11.5 - 16.0 g/dL    HCT 34.2 (L) 35.0 - 47.0 %    MCV 63.6 (L) 80.0 - 99.0 FL    MCH 18.8 (L) 26.0 - 34.0 PG    MCHC 29.5 (L) 30.0 - 36.5 g/dL    RDW 20.8 (H) 11.5 - 14.5 %    PLATELET 425 830 - 250 K/uL    NRBC 0.0 0  WBC    ABSOLUTE NRBC 0.00 0.00 - 0.01 K/uL    NEUTROPHILS 79 (H) 32 - 75 %    LYMPHOCYTES 15 12 - 49 %    MONOCYTES 6 5 - 13 %    EOSINOPHILS 0 0 - 7 %    BASOPHILS 0 0 - 1 %    IMMATURE GRANULOCYTES 0 0.0 - 0.5 %    ABS. NEUTROPHILS 5.6 1.8 - 8.0 K/UL    ABS. LYMPHOCYTES 1.1 0.8 - 3.5 K/UL    ABS. MONOCYTES 0.4 0.0 - 1.0 K/UL    ABS. EOSINOPHILS 0.0 0.0 - 0.4 K/UL    ABS. BASOPHILS 0.0 0.0 - 0.1 K/UL    ABS. IMM.  GRANS. 0.0 0.00 - 0.04 K/UL    DF SMEAR SCANNED      RBC COMMENTS ANISOCYTOSIS  1+        RBC COMMENTS MICROCYTOSIS  1+        RBC COMMENTS HYPOCHROMIA  1+        RBC COMMENTS TARGET CELLS  1+       METABOLIC PANEL, COMPREHENSIVE    Collection Time: 11/14/18 10:23 AM   Result Value Ref Range    Sodium 137 136 - 145 mmol/L    Potassium 4.0 3.5 - 5.1 mmol/L    Chloride 104 97 - 108 mmol/L    CO2 27 21 - 32 mmol/L    Anion gap 6 5 - 15 mmol/L    Glucose 99 65 - 100 mg/dL    BUN 9 6 - 20 MG/DL    Creatinine 0.74 0.55 - 1.02 MG/DL    BUN/Creatinine ratio 12 12 - 20      GFR est AA >60 >60 ml/min/1.73m2    GFR est non-AA >60 >60 ml/min/1.73m2    Calcium 9.0 8.5 - 10.1 MG/DL    Bilirubin, total 0.3 0.2 - 1.0 MG/DL    ALT (SGPT) 18 12 - 78 U/L    AST (SGOT) 27 15 - 37 U/L    Alk.  phosphatase 116 45 - 117 U/L    Protein, total 8.8 (H) 6.4 - 8.2 g/dL    Albumin 3.6 3.5 - 5.0 g/dL    Globulin 5.2 (H) 2.0 - 4.0 g/dL    A-G Ratio 0.7 (L) 1.1 - 2.2     ETHYL ALCOHOL    Collection Time: 11/14/18 10:23 AM   Result Value Ref Range    ALCOHOL(ETHYL),SERUM <10 <10 MG/DL   ACETAMINOPHEN    Collection Time: 11/14/18 10:23 AM   Result Value Ref Range    Acetaminophen level <2 (L) 10 - 30 ug/mL   SALICYLATE    Collection Time: 11/14/18 10:23 AM   Result Value Ref Range    Salicylate level 2.3 (L) 2.8 - 20.0 MG/DL   TROPONIN I    Collection Time: 11/14/18 10:23 AM   Result Value Ref Range    Troponin-I, Qt. <0.05 <0.05 ng/mL   EKG, 12 LEAD, INITIAL    Collection Time: 11/14/18 10:44 AM   Result Value Ref Range    Ventricular Rate 67 BPM    Atrial Rate 67 BPM    P-R Interval 138 ms    QRS Duration 90 ms    Q-T Interval 394 ms    QTC Calculation (Bezet) 416 ms    Calculated P Axis 64 degrees    Calculated R Axis 66 degrees    Calculated T Axis 43 degrees    Diagnosis       Normal sinus rhythm  Voltage criteria for left ventricular hypertrophy  Cannot rule out Septal infarct , age undetermined  T wave abnormality, consider anterior ischemia  No previous ECGs available  Confirmed by Sunshine Clements ([id number]) on 11/14/2018 4:03:16 PM     URINALYSIS W/MICROSCOPIC    Collection Time: 11/14/18 10:47 AM   Result Value Ref Range    Color YELLOW/STRAW      Appearance CLEAR CLEAR      Specific gravity 1.012 1.003 - 1.030      pH (UA) 8.0 5.0 - 8.0      Protein NEGATIVE  NEG mg/dL    Glucose NEGATIVE  NEG mg/dL    Ketone NEGATIVE  NEG mg/dL    Bilirubin NEGATIVE  NEG      Blood LARGE (A) NEG      Urobilinogen 1.0 0.2 - 1.0 EU/dL    Nitrites NEGATIVE  NEG      Leukocyte Esterase NEGATIVE  NEG      WBC 0-4 0 - 4 /hpf    RBC 5-10 0 - 5 /hpf    Epithelial cells FEW FEW /lpf    Bacteria NEGATIVE  NEG /hpf    Hyaline cast 0-2 0 - 5 /lpf   DRUG SCREEN, URINE    Collection Time: 11/14/18 10:47 AM   Result Value Ref Range    AMPHETAMINES NEGATIVE  NEG      BARBITURATES NEGATIVE  NEG      BENZODIAZEPINES NEGATIVE  NEG      COCAINE POSITIVE (A) NEG      METHADONE NEGATIVE  NEG      OPIATES POSITIVE (A) NEG      PCP(PHENCYCLIDINE) NEGATIVE  NEG      THC (TH-CANNABINOL) POSITIVE (A) NEG      Drug screen comment (NOTE)    . Assessment:     Active Problems:    * No active hospital problems. *    Depression    Hep C    Sickle cell trait    Sickle cell anemia    Etoh abuse    Plan:     MVI/Folate/Thiamine. Check LFTs    Transfuse for critical level    No VTE prophylaxis indicated or necessary at this time.    Signed By: Betzaida Chow MD     November 14, 2018

## 2018-11-14 NOTE — BSMART NOTE
Comprehensive Assessment Form Part 1 Section I - Disposition Axis I - Substance Induced Mood Disorder, Alcohol and Opiate Use Disorder Axis II - Deferred Axis III - Past Medical History:  
Diagnosis Date  Abnormal Papanicolaou smear of cervix  Back pain  Breast disorder   
 lump in left breast  
 Fatigue  Ill-defined condition Sickle Cell  Joint pain  Liver disease   
 hep c  
 Musculoskeletal disorder  Sickle cell trait ([facility] [address]   
 trait Axis IV - Financial, Substance Abuse [place] - 35 The Medical Doctor to Psychiatrist conference was not completed. The Medical Doctor is in agreement with Psychiatrist disposition because of (reason) Admission is recommended. Cecilia Perry The plan is admit to [facility] [id number]. The on-call Psychiatrist consulted was Dr. Neetu Jones. The admitting Psychiatrist will be Dr. Neetu Jones. The admitting Diagnosis is Substance Induced Mood Disorder. The Payor source is [facility] Section II - Integrated Summary Summary:  Patient came in by squad due to suicidal ideation. Patient reported she called herself. Patient indicated she was suicidal and would \"do anything\" to kill herself but did not verbalize a specific plan. Patient reported last night she attempted to overdose on heroin and unknown quantity of Benadryl. Patient also stated she drank a small amount of rubbing alcohol this morning \"just a little bit. \" ER Dr Christobal Bernheim indicated no concern based on her labs and presentation. Patient is medically cleared. Patient denied any past or present mental health or substance abuse treatment. Patient reported she has financial problems and substance abuse problems. She reported SA problems for \"as long as she can remember. \"  Patient reported depression and appears to be withdrawn and making little eye contact. Speech is soft but coherent and goal directed. She reports continued suicidal ideation without a plan. Patient also reported no specific person but stated she \"would like to hurt someone. \"  Patient denied history of aggression but then reported a pending assault charge resulting from an argument she had with a neighbor. UDS is positive for cocaine, opiates, and THC. No alcohol noted in system currently. Patient gave permission to call her mother Lizbet Bruno at [phone] and notify her of admission and to see if she can care for her 15 yo daughter. Kaela Tian indicated she could care for patient's daughter and asked that patient call her. Relayed message to patient. The patienthas demonstrated mental capacity to provide informed consent. The information is given by the patient. The Chief Complaint is suicidal. 
The Precipitant Factors are financial and substance abuse. Previous Hospitalizations: NA The patient has not previously been in restraints. Current Psychiatrist and/or  is NA. Lethality Assessment: 
 
The potential for suicide noted by the following: current attempt, ideation and current substance abuse . The potential for homicide is stating she would like to hurt someone but no plan or person. The patient has not been a perpetrator of sexual or physical abuse. There are pending charges and are listed as: assault. The patient is felt to be at risk for self harm or harm to others. The attending nurse was advised that security has not been notified. Section III - Psychosocial 
The patient's overall mood and attitude is depressed. Feelings of helplessness and hopelessness are observed by verbal statements. Generalized anxiety is not observed. Panic is not observed. Phobias are not observed. Obsessive compulsive tendencies are not observed. Section IV - Mental Status Exam 
The patient's appearance shows no evidence of impairment. The patient's behavior is guarded and shows poor eye contact.  The patient is oriented to time, place, person and situation. The patient's speech is soft. The patient's mood is depressed. The range of affect is flat. The patient's thought content demonstrates no evidence of impairment. The thought process shows no evidence of impairment. The patient's perception shows no evidence of impairment. The patient's memory shows no evidence of impairment. The patient's appetite is decreased and shows signs of weight loss. The patient's sleep has evidence of insomnia. The patient shows no insight. The patient's judgement is psychologically impaired. Section V - Substance Abuse The patient is using substances. The patient is using alcohol for greater than 10 years with last use on last night and heroin IV for greater than 10 years with last use on last night. The patient has experienced the following withdrawal symptoms: N/A. Section VI - Living Arrangements The patient is single. The patient lives alone. The patient has one child age 15. The patient does plan to return home upon discharge. The patient does have legal issues pending. The patient's source of income comes from none\"/assistance\". Gnosticism and cultural practices have not been voiced at this time. The patient's greatest support comes from none and this person will not be involved with the treatment. The patient has not been in an event described as horrible or outside the realm of ordinary life experience either currently or in the past. 
The patient has not been a victim of sexual/physical abuse. Section VII - Other Areas of Clinical Concern The highest grade achieved is 12th with the overall quality of school experience being described as NA. The patient is currently unemployed and speaks [place] as a primary language. The patient has no communication impairments affecting communication.  The patient's preference for learning can be described as: can read and write adequately.   The patient's hearing is normal.  The patient's vision is normal. 
 
 
[facility], [facility]

## 2018-11-14 NOTE — ED PROVIDER NOTES
EMERGENCY DEPARTMENT HISTORY AND PHYSICAL EXAM 
 
 
Date: 2018 Patient Name: Jayne Jeans History of Presenting Illness Chief Complaint Patient presents with  Anxiety Presents to the ED via EMS for c/o anxiety x last night. History Provided By: Patient HPI: Jayne Jeans, 35 y.o. female with PMHx significant for hepatitis C, presents via EMS to the ED with cc of SI. Pt reports she attempted to overdose on IV heroin unsuccessfully yesterday, and drank half a bottle of liquid benadryl last night in an additional attempt. She reports elevates stress secondary to legal and financial struggles; pt denies currently having any pending court dates. Pt reports hx of similar symptoms. She states she is interested in receiving help today. At time of examination, pt reports anxiety and mild chest tightness, but is otherwise without complaint. She specifically denies any fevers, chills, nausea, vomiting, chest pain, shortness of breath, headache, rash, diarrhea, sweating or weight loss. There are no other complaints, changes, or physical findings at this time. PCP: None Past History Past Medical History: 
Past Medical History:  
Diagnosis Date  Abnormal Papanicolaou smear of cervix  Back pain  Breast disorder   
 lump in left breast  
 Fatigue  Ill-defined condition Sickle Cell  Joint pain  Liver disease   
 hep c  
 Musculoskeletal disorder  Sickle cell trait ([facility] [address]   
 trait Past Surgical History: 
Past Surgical History:  
Procedure Laterality Date   DELIVERY ONLY    
 c/s in   HX GYN    
  Family History: 
Family History Problem Relation Age of Onset  Diabetes Maternal Grandmother  Hypertension Maternal Grandmother  Cancer Maternal Grandfather Social History: 
Social History Tobacco Use  Smoking status: Current Every Day Smoker Packs/day: 0.50   Years: 8.00  
 Pack years: 4.00  Smokeless tobacco: Never Used Substance Use Topics  Alcohol use: No  
  Alcohol/week: 3.5 oz Types: 7 Cans of beer per week Comment: weekends  Drug use: Yes Types: Inhalants, Heroin Allergies: 
No Known Allergies Review of Systems Review of Systems Constitutional: Negative. Negative for activity change, appetite change, chills, fatigue, fever and unexpected weight change. HENT: Negative. Negative for congestion, hearing loss, rhinorrhea, sneezing and voice change. Eyes: Negative. Negative for pain and visual disturbance. Respiratory: Positive for chest tightness. Negative for apnea, cough, choking and shortness of breath. Cardiovascular: Negative. Negative for chest pain and palpitations. Gastrointestinal: Negative. Negative for abdominal distention, abdominal pain, blood in stool, diarrhea, nausea and vomiting. Genitourinary: Negative. Negative for difficulty urinating, flank pain, frequency and urgency. No discharge Musculoskeletal: Negative. Negative for arthralgias, back pain, myalgias and neck stiffness. Skin: Negative. Negative for color change and rash. Neurological: Negative. Negative for dizziness, seizures, syncope, speech difficulty, weakness, numbness and headaches. Hematological: Negative for adenopathy. Psychiatric/Behavioral: Positive for suicidal ideas. Negative for agitation, behavioral problems and dysphoric mood. The patient is nervous/anxious. Physical Exam  
Physical Exam  
Constitutional: She is oriented to person, place, and time. She appears well-developed and well-nourished. No distress. HENT:  
Head: Normocephalic and atraumatic. Mouth/Throat: Oropharynx is clear and moist. No oropharyngeal exudate. Eyes: Conjunctivae and EOM are normal. Pupils are equal, round, and reactive to light. Right eye exhibits no discharge. Left eye exhibits no discharge. Neck: Normal range of motion. Neck supple. Cardiovascular: Normal rate, regular rhythm and intact distal pulses. Exam reveals no gallop and no friction rub. No murmur heard. Pulmonary/Chest: Effort normal and breath sounds normal. No respiratory distress. She has no wheezes. She has no rales. She exhibits no tenderness. Abdominal: Soft. Bowel sounds are normal. She exhibits no distension and no mass. There is no tenderness. There is no rebound and no guarding. Musculoskeletal: Normal range of motion. She exhibits no edema. Lymphadenopathy:  
  She has no cervical adenopathy. Neurological: She is alert and oriented to person, place, and time. No cranial nerve deficit. Coordination normal.  
Skin: Skin is warm and dry. No rash noted. No erythema. Psychiatric:  
Appears apathetic Nursing note and vitals reviewed. Diagnostic Study Results Labs - No results found for this or any previous visit (from the past 12 hour(s)). Medical Decision Making I am the first provider for this patient. I reviewed the vital signs, available nursing notes, past medical history, past surgical history, family history and social history. Vital Signs-Reviewed the patient's vital signs. No data found. Pulse Oximetry Analysis - 100% on RA Cardiac Monitor:  
Rate: 67 bpm 
Rhythm: Normal Sinus Rhythm EKG interpretation: 10:44 Rhythm: normal sinus rhythm; and regular . Rate (approx.): 67; Axis: normal; IL interval: normal; QRS interval: normal ; ST/T wave: normal; Other findings: normal. 
 
Records Reviewed: Nursing Notes and Old Medical Records Provider Notes (Medical Decision Making): DDx: drug overdose, SI, depression ED Course:  
Initial assessment performed. The patients presenting problems have been discussed, and they are in agreement with the care plan formulated and outlined with them. I have encouraged them to ask questions as they arise throughout their visit. Consult Note: 
11:29 AM 
Nixon Singleton MD spoke with Leota Boxer, Specialty: Candi Douglas Discussed pts hx, disposition, and available diagnostic and imaging results. Reviewed care plans. Consultant agrees with plans as outlined. Pt voluntary; she will look for placement. 12:51 PM 
Spoke with Leota Boxer, pt to be transferred to [facility] for placement. CRITICAL CARE NOTE : 
 
7:29 AM 
 
 
IMPENDING DETERIORATION -Psychiatric ASSOCIATED RISK FACTORS - SI 
 
MANAGEMENT- Transfer INTERPRETATION -  Screening EKG and labs INTERVENTIONS - psychiatric interventions CASE REVIEW - Medical Sub-Specialist 
 
TREATMENT RESPONSE -Stable PERFORMED BY - Self NOTES   : 
 
 
I have spent 65 minutes of critical care time involved in lab review, consultations with specialist, family decision- making, bedside attention and documentation. During this entire length of time I was immediately available to the patient . Sarah Rivas MD 
 
 
 
Disposition: 
Transfer Note: 
12:51 PM 
Pt is being transferred to [facility], transfer is accepted by David Pantoja MD. The reasons for their transfer have been discussed with them and available family. They convey agreement and understanding of the need to be transferred as explained to them by Carmen Del Castillo. Jyoti Singleton MD 
 
 
PLAN: 
1. Transfer to [facility] Diagnosis Clinical Impression: 1. Opioid dependence with withdrawal ([address] 2. Moderate episode of recurrent major depressive disorder (HCC) Attestations: This note is prepared by Lulu Manuel, acting as Scribe for No att. providers found. No att. providers found: The scribe's documentation has been prepared under my direction and personally reviewed by me in its entirety. I confirm that the note above accurately reflects all work, treatment, procedures, and medical decision making performed by me.

## 2018-11-14 NOTE — ED NOTES
Pt reports panic attacks and bad thoughts, states \"I don't want to live anymore and I'm scared\", states \"I am scared I might do something to myself\",

## 2018-11-14 NOTE — ED NOTES
TRANSFER - OUT REPORT: 
 
Verbal report given to Parker Gomez RN(name) on Brian Finley  being transferred to [facility] behavioral health(unit) for routine progression of care Report consisted of patients Situation, Background, Assessment and  
Recommendations(SBAR). Information from the following report(s) SBAR, Kardex, ED Summary, Procedure Summary, Intake/Output, MAR and Recent Results was reviewed with the receiving nurse. Lines:    
 
Opportunity for questions and clarification was provided. Patient transported with:  ELEANOR

## 2018-11-14 NOTE — ED NOTES
Pt states she started having suicidal ideations yesterday. Pt reports alcohol and drug use. Pt states she \"tried to overdose on heroin and benadryl but it didn't work\". Pt reports last drink was yesterday. Pt changed into paper scrubs. Sitter at bedside.

## 2018-11-15 PROBLEM — F32.A DEPRESSION: Status: ACTIVE | Noted: 2018-11-15

## 2018-11-15 LAB
ALBUMIN SERPL-MCNC: 3.3 G/DL (ref 3.5–5)
ALBUMIN/GLOB SERPL: 0.6 {RATIO} (ref 1.1–2.2)
ALP SERPL-CCNC: 116 U/L (ref 45–117)
ALT SERPL-CCNC: 17 U/L (ref 12–78)
AST SERPL-CCNC: 17 U/L (ref 15–37)
BILIRUB DIRECT SERPL-MCNC: 0.1 MG/DL (ref 0–0.2)
BILIRUB SERPL-MCNC: 0.2 MG/DL (ref 0.2–1)
GLOBULIN SER CALC-MCNC: 5.1 G/DL (ref 2–4)
PROT SERPL-MCNC: 8.4 G/DL (ref 6.4–8.2)

## 2018-11-15 PROCEDURE — 36415 COLL VENOUS BLD VENIPUNCTURE: CPT

## 2018-11-15 PROCEDURE — 74011000250 HC RX REV CODE- 250: Performed by: INTERNAL MEDICINE

## 2018-11-15 PROCEDURE — 65220000003 HC RM SEMIPRIVATE PSYCH

## 2018-11-15 PROCEDURE — 80076 HEPATIC FUNCTION PANEL: CPT

## 2018-11-15 PROCEDURE — 74011250637 HC RX REV CODE- 250/637: Performed by: PSYCHIATRY & NEUROLOGY

## 2018-11-15 RX ORDER — LOPERAMIDE HYDROCHLORIDE 2 MG/1
2 CAPSULE ORAL AS NEEDED
Status: DISCONTINUED | OUTPATIENT
Start: 2018-11-15 | End: 2018-11-19 | Stop reason: HOSPADM

## 2018-11-15 RX ORDER — METHADONE HYDROCHLORIDE 10 MG/1
30 TABLET ORAL ONCE
Status: COMPLETED | OUTPATIENT
Start: 2018-11-15 | End: 2018-11-15

## 2018-11-15 RX ORDER — DIPHENHYDRAMINE HCL 25 MG
50-75 CAPSULE ORAL
COMMUNITY
End: 2018-11-19

## 2018-11-15 RX ORDER — DICYCLOMINE HYDROCHLORIDE 10 MG/ML
20 INJECTION INTRAMUSCULAR
Status: DISCONTINUED | OUTPATIENT
Start: 2018-11-15 | End: 2018-11-19 | Stop reason: HOSPADM

## 2018-11-15 RX ADMIN — METHADONE HYDROCHLORIDE 30 MG: 10 TABLET ORAL at 16:34

## 2018-11-15 RX ADMIN — POLYETHYLENE GLYCOL 3350 17 G: 17 POWDER, FOR SOLUTION ORAL at 09:16

## 2018-11-15 NOTE — BH NOTES
GROUP THERAPY PROGRESS NOTE The patient Cristina sosa 35 y.o. female is participating in [employer]. Group time: 1 hour Personal goal for participation: To concentrate on selected task Goal orientation: social 
 
Group therapy participation: active Therapeutic interventions reviewed and discussed: Crafts, games, music Impression of participation: The patient was attentive-required prompting [employer] 11/15/2018  6:21 PM

## 2018-11-15 NOTE — PROGRESS NOTES
Problem: Depressed Mood (Adult/Pediatric)  Goal: *STG: Remains safe in hospital  Outcome: Progressing Towards Goal  Patient alert and oriented. Denies SI/HI but appears depressed and apathetic with flat affect. No behavioral issues at this time. Will continue assessments and safety checks.
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
## 2018-11-15 NOTE — BH NOTES
Remained in bed resting quietly. Patient slept 7 hours this shift. No distress noted up to this point of shift. Staff will continue to monitor q15 minutes for safety.

## 2018-11-15 NOTE — PROGRESS NOTES
Offer diet as tolerated. I have added supplements to pt trays. Hx notable for sickle cell trait, hep C, low weight/ BMI.   Ht: 5'7\"  Wt: 116 lb 2.9 oz  BMI: 18.2 kg/(m^2) c/w underweight  Est energy needs: 1735 kcal, 64 g protein, 1 mL/kcal fluids  Pt will consume > 75% of meals at follow up

## 2018-11-15 NOTE — PROGRESS NOTES
GROUP THERAPY PROGRESS NOTE      Hanna Hallman was present for medication group. GROUP TIME: 45 minutes, Thursdays 2pm    PERSONAL GOAL FOR PARTICIPATION: To be present for group, participate in discussion, and answer patient-directed questions. GOAL ORIENTATION: Personal    THERAPEUTIC INTERVENTIONS REVIEWED AND DISCUSSED: The following topics were presented: storage of medications, how to remember to refill medications and keep up with doctor appointments, relapse prevention, keeping a record of all medication including prescription and non-prescription drugs, and who to contact with medication questions. Patients were given time to ask questions regarding their current therapy. IMPRESSION OF PARTICIPATION: Patient did not participate in any group discussions. She left group temporarily, returned when we were starting [place] but did not want to play. Patient was given a pillbox at the end of group.       Merissa Forrest, LUIS ALFREDOD, BCPS

## 2018-11-15 NOTE — BH NOTES
Pt is visible in milieu, interacts appropriately with peers and staff. Medication compliant and ate HS snack. Pt observed resting quietly, respirations even and unlabored. No s/s distress noted, no complaints voiced. Pt slept 7 hours. Will continue Q 15 minute monitoring for safety.

## 2018-11-15 NOTE — PROGRESS NOTES
[facility] Admission Pharmacy Medication Reconciliation     Recommendations/Findings:   1) Patient confirms no medication allergies. 2) Patient reports no prescription medications prior to admission and only uses over-the-counter sleep aids, sometimes taking both Tylenol PM and diphenhydramine to help her sleep. Total Time Spent:  5 minutes     Information obtained from: Patient interview    Patient allergies: Allergies as of 11/14/2018    (No Known Allergies)       Prior to Admission Medications   Prescriptions Last Dose Informant Patient Reported? Taking?   acetaminophen 500 mg tab 500 mg, diphenhydrAMINE 25 mg cap 25 mg   Yes Yes   Sig: Take 1 Dose by mouth nightly as needed (Insomnia). diphenhydrAMINE (BENADRYL) 25 mg capsule   Yes Yes   Sig: Take 50-75 mg by mouth nightly as needed (insomnia).       Facility-Administered Medications: None        Thank you,  Theo Liu, PHARMD, BCPS
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
## 2018-11-16 PROBLEM — F43.10 PTSD (POST-TRAUMATIC STRESS DISORDER): Status: ACTIVE | Noted: 2018-11-16

## 2018-11-16 PROBLEM — F33.1 MODERATE EPISODE OF RECURRENT MAJOR DEPRESSIVE DISORDER (HCC): Status: ACTIVE | Noted: 2018-11-16

## 2018-11-16 PROBLEM — F11.23 OPIOID DEPENDENCE WITH WITHDRAWAL (HCC): Status: ACTIVE | Noted: 2018-11-16

## 2018-11-16 PROCEDURE — 74011250637 HC RX REV CODE- 250/637: Performed by: PSYCHIATRY & NEUROLOGY

## 2018-11-16 PROCEDURE — 65220000003 HC RM SEMIPRIVATE PSYCH

## 2018-11-16 RX ORDER — TRAZODONE HYDROCHLORIDE 50 MG/1
50 TABLET ORAL
Status: DISCONTINUED | OUTPATIENT
Start: 2018-11-16 | End: 2018-11-19 | Stop reason: HOSPADM

## 2018-11-16 RX ORDER — CITALOPRAM 20 MG/1
20 TABLET, FILM COATED ORAL
Status: DISCONTINUED | OUTPATIENT
Start: 2018-11-16 | End: 2018-11-19 | Stop reason: HOSPADM

## 2018-11-16 RX ADMIN — MAGNESIUM HYDROXIDE 30 ML: 400 SUSPENSION ORAL at 14:19

## 2018-11-16 RX ADMIN — Medication 100 MG: at 08:41

## 2018-11-16 RX ADMIN — FOLIC ACID 1 MG: 1 TABLET ORAL at 08:41

## 2018-11-16 RX ADMIN — TRAZODONE HYDROCHLORIDE 50 MG: 50 TABLET ORAL at 21:02

## 2018-11-16 RX ADMIN — METHADONE HYDROCHLORIDE 15 MG: 10 TABLET ORAL at 11:58

## 2018-11-16 RX ADMIN — MULTIPLE VITAMINS W/ MINERALS TAB 1 TABLET: TAB at 08:41

## 2018-11-16 RX ADMIN — CITALOPRAM HYDROBROMIDE 20 MG: 20 TABLET ORAL at 21:02

## 2018-11-16 NOTE — PROGRESS NOTES
Problem: Depressed Mood (Adult/Pediatric)  Goal: *STG: Remains safe in hospital  Outcome: Progressing Towards Goal  Patient alert and oriented. Compliant with meals and medications. Denies SI/HI. No behavioral issues at this time. Will continue assessments and safety checks.

## 2018-11-16 NOTE — BH NOTES
GROUP THERAPY PROGRESS NOTE The patient Farzad Snyder a 35 y.o. female is participating in Creative Expression Group. Group time: 1 hour Personal goal for participation: To concentrate on selected task Goal orientation: social 
 
Group therapy participation: active Therapeutic interventions reviewed and discussed: Crafts, games, music Impression of participation: The patient was attentive-left close to the end of session-reported not feeling well Elizabeth Natarajan 11/16/2018  6:11 PM

## 2018-11-16 NOTE — BH NOTES
Social Work     Pt was seen in treatment team this morning. Pt is alert and oriented. Pt denies SI/HI. Pt's mood is depressed, affect is congruent to mood but she reports she is \"feeling a little bit better. \" Pt's thought process is linear. Pt's insight fair to poor and judgment is poor, reliability is fair to poor. Writer spoke with pt's mother who is supportive of pt's treatment. Plan is to continue detox and stabilize with possible discharge Monday. Social work department will continue to coordinate discharge plans.

## 2018-11-16 NOTE — BH NOTES
GROUP THERAPY PROGRESS NOTE The patient Farzad Snyder a 35 y.o. female is participating in Coping Skills Group. Group time: 45 minutes Personal goal for participation: To participate in coping skills game Goal orientation:  personal 
 
Group therapy participation: active Therapeutic interventions reviewed and discussed: positive coping strategies Impression of participation:  The patient was attentive. Elizabeth Natarajan 11/16/2018  6:00 PM

## 2018-11-16 NOTE — BH NOTES
Remained in bed resting quietly. Patient slept 7 hours this shift. No distress noted during shift. Patient's CIWA an COW remained 0 throughout the night. Staff will continue to monitor q15 minutes for safety.
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
## 2018-11-17 PROCEDURE — 74011000250 HC RX REV CODE- 250: Performed by: INTERNAL MEDICINE

## 2018-11-17 PROCEDURE — 74011250637 HC RX REV CODE- 250/637: Performed by: PSYCHIATRY & NEUROLOGY

## 2018-11-17 PROCEDURE — 65220000003 HC RM SEMIPRIVATE PSYCH

## 2018-11-17 RX ADMIN — POLYETHYLENE GLYCOL 3350 17 G: 17 POWDER, FOR SOLUTION ORAL at 09:35

## 2018-11-17 RX ADMIN — CITALOPRAM HYDROBROMIDE 20 MG: 20 TABLET ORAL at 21:10

## 2018-11-17 RX ADMIN — FOLIC ACID 1 MG: 1 TABLET ORAL at 09:33

## 2018-11-17 RX ADMIN — TRAZODONE HYDROCHLORIDE 50 MG: 50 TABLET ORAL at 21:10

## 2018-11-17 RX ADMIN — MULTIPLE VITAMINS W/ MINERALS TAB 1 TABLET: TAB at 09:34

## 2018-11-17 RX ADMIN — Medication 100 MG: at 09:34

## 2018-11-17 RX ADMIN — METHADONE HYDROCHLORIDE 15 MG: 10 TABLET ORAL at 09:33

## 2018-11-17 NOTE — PROGRESS NOTES
Problem: Depressed Mood (Adult/Pediatric)  Goal: *STG: Participates in treatment plan  Outcome: Progressing Towards Goal  7p-7a  Pt slept 7 hours overnight. No medical/behaviorial concerns or signs of distress. Respirations even and unlabored. CIWA ranged 0-2, COWS 0 all night. Pt adheres to meals and medication regimen. Staff will continue to monitor for health and safety.

## 2018-11-17 NOTE — PROGRESS NOTES
Problem: Depressed Mood (Adult/Pediatric)  Goal: *STG: Remains safe in hospital  Outcome: Progressing Towards Goal  Alert and oriented. No acute distress apparent. Denies SI/HI or AV hallucinations. Appetite fair. Medication compliant.

## 2018-11-17 NOTE — PROGRESS NOTES
Problem: Depressed Mood (Adult/Pediatric)  Goal: *STG: Remains safe in hospital  Outcome: Progressing Towards Goal  COW- 0 and CIWA-0

## 2018-11-17 NOTE — BH NOTES
7p-11p  CIWA 2, COWS 0. Mild nausea, some anxiety. Meal and med compliant. Took scheduled trazodone. Staff will continue to monitor for health and safety.

## 2018-11-18 PROCEDURE — 65220000003 HC RM SEMIPRIVATE PSYCH

## 2018-11-18 PROCEDURE — 74011250637 HC RX REV CODE- 250/637: Performed by: PSYCHIATRY & NEUROLOGY

## 2018-11-18 RX ADMIN — Medication 100 MG: at 09:11

## 2018-11-18 RX ADMIN — IBUPROFEN 400 MG: 400 TABLET ORAL at 10:05

## 2018-11-18 RX ADMIN — CITALOPRAM HYDROBROMIDE 20 MG: 20 TABLET ORAL at 21:08

## 2018-11-18 RX ADMIN — FOLIC ACID 1 MG: 1 TABLET ORAL at 09:11

## 2018-11-18 RX ADMIN — TRAZODONE HYDROCHLORIDE 50 MG: 50 TABLET ORAL at 21:08

## 2018-11-18 RX ADMIN — MULTIPLE VITAMINS W/ MINERALS TAB 1 TABLET: TAB at 09:11

## 2018-11-18 NOTE — PROGRESS NOTES
Problem: Depressed Mood (Adult/Pediatric)  Goal: *STG: Verbalizes anger, guilt, and other feelings in a constructive manor  Outcome: Progressing Towards Goal  Pt observed for symptoms of withdrawal, assess q 4 hours while wake. Encourage to attend groups and acitvites. Monitor for effectiveness of medication. Encourage to include plan for sobriety in discharge plan. Pt denies suicidal and homicidal ideation and contracts for safety. Pt denies suicidal and homicidal ideation and contracts for safety. observe on q15' checks.
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
## 2018-11-19 VITALS
DIASTOLIC BLOOD PRESSURE: 57 MMHG | SYSTOLIC BLOOD PRESSURE: 103 MMHG | HEART RATE: 64 BPM | OXYGEN SATURATION: 100 % | BODY MASS INDEX: 18.24 KG/M2 | HEIGHT: 67 IN | WEIGHT: 116.18 LBS | RESPIRATION RATE: 18 BRPM | TEMPERATURE: 98.2 F

## 2018-11-19 PROCEDURE — 74011250637 HC RX REV CODE- 250/637: Performed by: PSYCHIATRY & NEUROLOGY

## 2018-11-19 RX ORDER — TRAZODONE HYDROCHLORIDE 50 MG/1
50 TABLET ORAL
Qty: 15 TAB | Refills: 1 | Status: SHIPPED | OUTPATIENT
Start: 2018-11-19 | End: 2018-11-19

## 2018-11-19 RX ORDER — CITALOPRAM 20 MG/1
20 TABLET, FILM COATED ORAL
Qty: 15 TAB | Refills: 1 | Status: SHIPPED | OUTPATIENT
Start: 2018-11-19 | End: 2018-11-19

## 2018-11-19 RX ORDER — TRAZODONE HYDROCHLORIDE 50 MG/1
50 TABLET ORAL
Qty: 15 TAB | Refills: 1 | Status: ON HOLD | OUTPATIENT
Start: 2018-11-19 | End: 2021-12-06

## 2018-11-19 RX ORDER — CITALOPRAM 20 MG/1
20 TABLET, FILM COATED ORAL
Qty: 15 TAB | Refills: 1 | Status: ON HOLD | OUTPATIENT
Start: 2018-11-19 | End: 2021-12-06

## 2018-11-19 RX ADMIN — Medication 100 MG: at 08:32

## 2018-11-19 RX ADMIN — MULTIPLE VITAMINS W/ MINERALS TAB 1 TABLET: TAB at 08:32

## 2018-11-19 RX ADMIN — FOLIC ACID 1 MG: 1 TABLET ORAL at 08:32

## 2018-11-19 NOTE — BH NOTES
Remained in bed resting quietly. Patient slept 7 hours this shift. No distress noted, respirations even and unlabored. No complaints voiced. Staff will continue to monitor q15 minutes for safety.

## 2018-11-19 NOTE — PROGRESS NOTES
Problem: Depressed Mood (Adult/Pediatric)  Goal: *STG: Participates in treatment plan  Participates intreatment team  Goal: *STG: Attends activities and groups  Attending group and activities  Goal: *STG: Complies with medication therapy  Medication compliant
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
## 2018-11-19 NOTE — BH NOTES
Behavioral Health Transition Record to Provider    Patient Name: Evangelist Nayak  YOB: 1985  Medical Record Number: [id number]  Date of Admission: 11/14/2018  Date of Discharge: 11/19/2018    Attending Provider: Amarjit Simmons MD  Discharging Provider: Neetu Cruz MD  To contact this individual call [phone] and ask the  to page. If unavailable, ask to be transferred to [facility] Provider on call. [facility] Provider will be available on call 24/7 and during holidays. Primary Care Provider: None    No Known Allergies    Reason for Admission: Pt brought to [facility] ED via emergency services reporting depression and SI. Pt reported SI with no verbalization of a specific plan. However, pt stated that the night before she had made an attempt to overdose on heroin and an unknown amount of Benadryl. Pt also revealed earlier in the day she ingested \"just a little bit\"([employer]) of rubbing alcohol. Pt reported insomnia and appetite issues resulting in weight loss. Pt's psychosocial stressors present as SA, legal and financial.         Admission Diagnosis: depression  Depression    * No surgery found *    Results for orders placed or performed during the hospital encounter of 11/14/18   HEPATIC FUNCTION PANEL   Result Value Ref Range    Protein, total 8.4 (H) 6.4 - 8.2 g/dL    Albumin 3.3 (L) 3.5 - 5.0 g/dL    Globulin 5.1 (H) 2.0 - 4.0 g/dL    A-G Ratio 0.6 (L) 1.1 - 2.2      Bilirubin, total 0.2 0.2 - 1.0 MG/DL    Bilirubin, direct 0.1 0.0 - 0.2 MG/DL    Alk.  phosphatase 116 45 - 117 U/L    AST (SGOT) 17 15 - 37 U/L    ALT (SGPT) 17 12 - 78 U/L       Immunizations administered during this encounter:   Immunization History   Administered Date(s) Administered    Rho(D) Immune Globulin - IM 03/23/2016, 01/19/2018       Screening for Metabolic Disorders for Patients on Antipsychotic Medications  (Data obtained from the EMR)    Estimated Body Mass Index  Estimated body mass index is 18.2 kg/m² as calculated from the following:    Height as of this encounter: 5' 7\" (1.702 m). Weight as of this encounter: 52.7 kg (116 lb 2.9 oz). Vital Signs/Blood Pressure  Visit Vitals  /70 (BP 1 Location: Left arm)   Pulse 64   Temp 98.2 °F (36.8 °C)   Resp 21   Ht 5' 7\" (1.702 m)   Wt 52.7 kg (116 lb 2.9 oz)   LMP 11/07/2018   SpO2 100%   Breastfeeding? No   BMI 18.20 kg/m²       Blood Glucose/Hemoglobin A1c  Lab Results   Component Value Date/Time    Glucose 99 11/14/2018 10:23 AM    Glucose (POC) 107 (H) 12/09/2011 08:51 PM       No results found for: HBA1C, HGBE8, IKB8XLOT     Lipid Panel  No results found for: CHOL, CHOLX, CHLST, CHOLV, 694859, HDL, LDL, LDLC, DLDLP, TGLX, TRIGL, TRIGP, CHHD, CHHDX     Discharge Diagnosis: Please refer to psychiatrist's note. Discharge Plan: Social Work-Pt will be discharged today. Pt is alert and oriented. Pt denies SI/HI. Pt is free of delusions. Pt's mood is euthymic. Pt's thought process is linear and goal oriented. Pt's mother updated and pt will be provided BS transportation to her home at discharge. Pt advised to attend at discharge to NA meetings-[facility] schedule provided. Pt provided prescriptions. Pt is in agreement with the plan. Follow-up 11/20/2018 @ 9:45am to [facility] for outpatient treatment/psychiatry and therapy intake. [facility] care plan will be faxed electronically via [facility] to [facility] fax#[phone]. Discharge Medication List and Instructions:   Current Discharge Medication List          Unresulted Labs (24h ago, onward)    None        To obtain results of studies pending at discharge, please contact [phone]    Follow-up Information     Follow up With Specialties Details Why Contact Info    [facility] NA   Follow up at discharge with a meeting to protect sobriety.  *[facility] NA Schedule    [facility]  On 11/20/2018 Follow up 11/20/2018 @ 9:45am (you'll receive text saying 10:30am for medical review) with [facility] for intake to establish outpatient  clinic services. *please bring id and and insurance card. Amelia ROSS, [facility]  [phone]  [facility]#[phone]          Advanced Directive:   Does the patient have an appointed surrogate decision maker? Yes  Does the patient have a Medical Advance Directive? No  Does the patient have a Psychiatric Advance Directive? No  If the patient does not have a surrogate or Medical Advance Directive AND Psychiatric Advance Directive, the patient was offered information on these advance directives Patient will complete at a later time    Patient Instructions: Please continue all medications until otherwise directed by physician. Tobacco Cessation Discharge Plan:   Is the patient a smoker and needs referral for smoking cessation? Not applicable  Patient referred to the following for smoking cessation with an appointment? Not applicable     Patient was offered medication to assist with smoking cessation at discharge? Not applicable  Was education for smoking cessation added to the discharge instructions? Not applicable    Alcohol/Substance Abuse Discharge Plan:   Does the patient have a history of substance/alcohol abuse and requires a referral for treatment? Yes  Patient referred to the following for substance/alcohol abuse treatment with an appointment? Yes  Patient was offered medication to assist with alcohol cessation at discharge? No  Was education for substance/alcohol abuse added to discharge instructions? No    Patient discharged to Home; provided to the patient/caregiver either in hard copy or electronically.

## 2018-11-19 NOTE — BH NOTES
Discharge instructions were given and explained. The patient verbalied an understanding. the  also included a paper with different meetings that the patient can attend. The patient had no home medications to be returned. all valuables and other belongings were returned upon discharge. staff took patient off the unit.

## 2019-07-15 ENCOUNTER — HOSPITAL ENCOUNTER (EMERGENCY)
Age: 34
Discharge: HOME | End: 2019-07-15
Attending: EMERGENCY MEDICINE
Payer: MEDICAID

## 2019-07-15 VITALS
RESPIRATION RATE: 18 BRPM | OXYGEN SATURATION: 99 % | HEIGHT: 67 IN | DIASTOLIC BLOOD PRESSURE: 47 MMHG | BODY MASS INDEX: 21.19 KG/M2 | TEMPERATURE: 98.1 F | SYSTOLIC BLOOD PRESSURE: 100 MMHG | WEIGHT: 135 LBS | HEART RATE: 71 BPM

## 2019-07-15 DIAGNOSIS — R55 NEAR SYNCOPE: Primary | ICD-10-CM

## 2019-07-15 DIAGNOSIS — T67.5XXA HEAT EXHAUSTION, INITIAL ENCOUNTER: ICD-10-CM

## 2019-07-15 PROCEDURE — 99283 EMERGENCY DEPT VISIT LOW MDM: CPT

## 2019-07-15 NOTE — ED PROVIDER NOTES
35 y.o. female with past medical history significant for drug abuse and hepatitis C who presents from home via EMS with chief complaint of lightheadedness. Patient reports being about 18 weeks pregnant, her LMP was \"in March or April\", has seen OB/GYN. She is on bedrest due to a complicated pregnancy, has a court appointment this morning. She has an appointment this afternoon at 1345. Patient states this morning, she was sitting outside with her friend for some time. When she tried to stand up, she had a near syncopal event with diaphoresis, lightheadedness, and chest tightness. Her sx lasted for about 15 minutes, and now have resolved. She tried drinking water during the episode without significant change. Patient reports a h/o similar sx, which she states were from dehydration in her previous pregnancies. Patient reports being followed at a methadone clinic, endorses current heroin use, last was this morning around 0600. Patient denies LOC. She denies any vomiting or diarrhea. There are no other acute medical concerns at this time. Social hx: Current smoker; Current EtOH use; Heroin and inhalant use  OB/GYN: Deshawn Fritz MD    Note written by Laron Howard, as dictated by Malachi Fleming MD 12:10 PM    The history is provided by the patient and medical records. No  was used.         Past Medical History:   Diagnosis Date    Abnormal Papanicolaou smear of cervix     Back pain     Breast disorder     lump in left breast    Drug abuse (HCC)     Fatigue     High-risk pregnancy     Ill-defined condition     Sickle Cell    Joint pain     Liver disease     hep c    Musculoskeletal disorder     Sickle cell trait ([facility] [address]     trait       Past Surgical History:   Procedure Laterality Date     DELIVERY ONLY      c/s in     HX GYN               Family History:   Problem Relation Age of Onset    Diabetes Maternal Grandmother     Hypertension Maternal Grandmother     Cancer Maternal Grandfather        Social History     Socioeconomic History    Marital status: SINGLE     Spouse name: Not on file    Number of children: Not on file    Years of education: Not on file    Highest education level: Not on file   Occupational History    Not on file   Social Needs    Financial resource strain: Not on file    Food insecurity:     Worry: Not on file     Inability: Not on file    Transportation needs:     Medical: Not on file     Non-medical: Not on file   Tobacco Use    Smoking status: Current Every Day Smoker     Packs/day: 0.50     Years: 8.00     Pack years: 4.00    Smokeless tobacco: Never Used   Substance and Sexual Activity    Alcohol use: No     Alcohol/week: 3.5 oz     Types: 7 Cans of beer per week     Comment: weekends    Drug use: Yes     Types: Inhalants, Heroin    Sexual activity: Yes     Partners: Male   Lifestyle    Physical activity:     Days per week: Not on file     Minutes per session: Not on file    Stress: Not on file   Relationships    Social connections:     Talks on phone: Not on file     Gets together: Not on file     Attends Oriental orthodox service: Not on file     Active member of club or organization: Not on file     Attends meetings of clubs or organizations: Not on file     Relationship status: Not on file    Intimate partner violence:     Fear of current or ex partner: Not on file     Emotionally abused: Not on file     Physically abused: Not on file     Forced sexual activity: Not on file   Other Topics Concern    Not on file   Social History Narrative    Not on file         ALLERGIES: Patient has no known allergies. Review of Systems   Constitutional: Positive for diaphoresis. Negative for fever. HENT: Negative for facial swelling. Eyes: Negative for visual disturbance. Respiratory: Positive for chest tightness. Cardiovascular: Negative for chest pain.    Gastrointestinal: Negative for abdominal pain, diarrhea and vomiting. Genitourinary: Negative for difficulty urinating and dysuria. Musculoskeletal: Negative for arthralgias. Skin: Negative for rash. Neurological: Positive for light-headedness. Negative for syncope and headaches. Hematological: Negative for adenopathy. Psychiatric/Behavioral: Positive for behavioral problems (Drug abuse). Negative for suicidal ideas. All other systems reviewed and are negative. Vitals:    07/15/19 1212   BP: 100/47   Pulse: 71   Resp: 18   Temp: 98.1 °F (36.7 °C)   SpO2: 99%   Weight: 61.2 kg (135 lb)   Height: 5' 7\" (1.702 m)            Physical Exam   Constitutional: She is oriented to person, place, and time. She appears well-developed and well-nourished. No distress. HENT:   Head: Normocephalic and atraumatic. Mouth/Throat: Oropharynx is clear and moist.   Eyes: Pupils are equal, round, and reactive to light. No scleral icterus. Neck: Normal range of motion. Neck supple. No thyromegaly present. Cardiovascular: Normal rate, regular rhythm, normal heart sounds and intact distal pulses. No murmur heard. Pulmonary/Chest: Effort normal and breath sounds normal. No respiratory distress. Abdominal: Bowel sounds are normal. There is no tenderness. Gravid abdomen. Musculoskeletal: Normal range of motion. She exhibits no edema. Neurological: She is alert and oriented to person, place, and time. Skin: Skin is warm and dry. No rash noted. She is not diaphoretic. Nursing note and vitals reviewed. Note written by Laron Lane, as dictated by Ender Gongora MD 12:10 PM    MDM  Number of Diagnoses or Management Options  Heat exhaustion, initial encounter:   Near syncope:        VS stable. Tolerating po's. Has appt with OB this afternoon.     Procedures

## 2019-07-15 NOTE — ED NOTES
Discharge instructions given to pt. All questions answered and pt verbalized understanding. No lines or drains in place. Pt ambulatory out of unit.

## 2019-07-15 NOTE — ED TRIAGE NOTES
Triage: Pt to ED from friends home due to concerns over dizziness, fatigue, vision changes. Pt states symptoms began following standing and moving around. Pt states she is normally on bed rest due to complicated pregnancy. Pt states this morning had to attend a court appointment, Pt also mentions heroine use around 6AM.  Pt states she is followed at a methadone clinic. Pt states she is currently 18 wks pregnant and had a cerclage. On arrival, Pt is A/O with no visible cues of distress.

## 2019-07-15 NOTE — DISCHARGE INSTRUCTIONS
Patient Education        Heat Exhaustion: Care Instructions  Your Care Instructions  Heat exhaustion occurs when you are hot, sweat a lot, and do not drink enough to replace the lost fluids. Heat exhaustion is not the same as heatstroke, which is much more serious. Heatstroke can lead to problems with many different organs and can be life-threatening. After medical care for heat exhaustion, you will need to limit your activities and take good care of your body while it recovers. Follow-up care is a key part of your treatment and safety. Be sure to make and go to all appointments, and call your doctor if you are having problems. It's also a good idea to know your test results and keep a list of the medicines you take. How can you care for yourself at home? · Reduce your activities, and get plenty of rest. Your doctor will give you instructions on when you can resume your normal schedule. · Stay in a cool room for at least the next 24 hours. · Drink rehydration drinks, juices, and water to replace fluids. Drinks such as sports drinks that contain electrolytes work best, because they have salt and minerals. You need salt and minerals as well as water. You are drinking enough fluids when your urine is normal in color (light yellow or clear), and you are urinating every 2 to 4 hours. If you have kidney, heart, or liver disease and have to limit fluids or salt, talk with your doctor before you increase your fluid or salt intake. · Avoid drinks that have caffeine or alcohol. To prevent heat exhaustion  · Drink plenty of fluids, enough so that your urine is light yellow or clear like water. If you have kidney, heart, or liver disease and have to limit fluids, talk with your doctor before you increase the amount of fluids you drink. · Drink plenty of water before, during, and after you are active. This is very important when it is hot out and when you do intense exercise.   · During hot weather, wear light-colored clothing that fits loosely and a hat with a brim to reflect the sun. · Limit or avoid strenuous activity during hot or humid weather, especially during the hottest part of the day (10 a.m. to 4 p.m.). Heat exhaustion and heatstroke usually develop when you are working or exercising in hot weather. Humidity makes hot weather even more dangerous. · Cars can get very hot inside. Open the windows or turn on the air conditioning before you get in and close the doors. · Try to stay cool during hot weather. If your home is not air-conditioned, seek an air-conditioned place. That could be in [employer], a neighborhood café, or a friend's home. [place] yourself with a cool mist. Take a cool shower, bath, or sponge bath. · Be aware that some medicines, such as major tranquilizers, can raise the risk of heat exhaustion. Ask your doctor whether any medicine you take raises your chance of getting heat exhaustion. When should you call for help? Call 911 anytime you think you may need emergency care. For example, call if:    · You feel very hot and:  ? You have a seizure. ? You feel confused. ? Your skin is red, hot, and dry. ? You passed out (lost consciousness).    Call your doctor now or seek immediate medical care if:    · You cannot keep fluids down.     · After returning to your normal activities, you have symptoms of heat exhaustion, such as sweating a lot, fatigue, dizziness, or nausea.    Watch closely for changes in your health, and be sure to contact your doctor if:    · You do not get better as expected. Where can you learn more? Go to http://dav-marcella.info/. Enter S222 in the search box to learn more about \"Heat Exhaustion: Care Instructions. \"  Current as of: September 23, 2018  Content Version: 11.9  © 5502-7908 [employer]. Care instructions adapted under license by [facility] (which disclaims liability or warranty for this information).  If you have questions about a medical condition or this instruction, always ask your healthcare professional. [place] [address] any warranty or liability for your use of this information.

## 2021-12-05 ENCOUNTER — HOSPITAL ENCOUNTER (INPATIENT)
Age: 36
LOS: 4 days | Discharge: HOME | DRG: 751 | End: 2021-12-09
Attending: EMERGENCY MEDICINE | Admitting: PSYCHIATRY & NEUROLOGY
Payer: MEDICAID

## 2021-12-05 DIAGNOSIS — F11.90 OPIOID USE: ICD-10-CM

## 2021-12-05 DIAGNOSIS — F32.2 CURRENT SEVERE EPISODE OF MAJOR DEPRESSIVE DISORDER WITHOUT PSYCHOTIC FEATURES, UNSPECIFIED WHETHER RECURRENT (HCC): Primary | ICD-10-CM

## 2021-12-05 DIAGNOSIS — R45.851 SUICIDAL IDEATIONS: ICD-10-CM

## 2021-12-05 DIAGNOSIS — F43.10 PTSD (POST-TRAUMATIC STRESS DISORDER): ICD-10-CM

## 2021-12-05 PROBLEM — F32.9 MDD (MAJOR DEPRESSIVE DISORDER): Status: ACTIVE | Noted: 2021-12-05

## 2021-12-05 LAB
ALBUMIN SERPL-MCNC: 3 G/DL (ref 3.5–5)
ALBUMIN/GLOB SERPL: 0.6 {RATIO} (ref 1.1–2.2)
ALP SERPL-CCNC: 358 U/L (ref 45–117)
ALT SERPL-CCNC: 22 U/L (ref 12–78)
AMPHET UR QL SCN: NEGATIVE
ANION GAP SERPL CALC-SCNC: 6 MMOL/L (ref 5–15)
APPEARANCE UR: CLEAR
AST SERPL-CCNC: 41 U/L (ref 15–37)
BACTERIA URNS QL MICRO: ABNORMAL /HPF
BARBITURATES UR QL SCN: NEGATIVE
BASOPHILS # BLD: 0 K/UL (ref 0–0.1)
BASOPHILS NFR BLD: 0 % (ref 0–1)
BENZODIAZ UR QL: NEGATIVE
BILIRUB SERPL-MCNC: 0.3 MG/DL (ref 0.2–1)
BILIRUB UR QL: NEGATIVE
BUN SERPL-MCNC: 8 MG/DL (ref 6–20)
BUN/CREAT SERPL: 9 (ref 12–20)
CALCIUM SERPL-MCNC: 9 MG/DL (ref 8.5–10.1)
CANNABINOIDS UR QL SCN: NEGATIVE
CHLORIDE SERPL-SCNC: 100 MMOL/L (ref 97–108)
CO2 SERPL-SCNC: 30 MMOL/L (ref 21–32)
COCAINE UR QL SCN: NEGATIVE
COLOR UR: ABNORMAL
CREAT SERPL-MCNC: 0.86 MG/DL (ref 0.55–1.02)
DIFFERENTIAL METHOD BLD: ABNORMAL
DRUG SCRN COMMENT,DRGCM: ABNORMAL
EOSINOPHIL # BLD: 0 K/UL (ref 0–0.4)
EOSINOPHIL NFR BLD: 0 % (ref 0–7)
EPITH CASTS URNS QL MICRO: ABNORMAL /LPF
ERYTHROCYTE [DISTWIDTH] IN BLOOD BY AUTOMATED COUNT: 16.3 % (ref 11.5–14.5)
ETHANOL SERPL-MCNC: <10 MG/DL
FLUAV RNA SPEC QL NAA+PROBE: NOT DETECTED
FLUBV RNA SPEC QL NAA+PROBE: NOT DETECTED
GLOBULIN SER CALC-MCNC: 5.3 G/DL (ref 2–4)
GLUCOSE SERPL-MCNC: 115 MG/DL (ref 65–100)
GLUCOSE UR STRIP.AUTO-MCNC: NEGATIVE MG/DL
HCG UR QL: NEGATIVE
HCT VFR BLD AUTO: 38.6 % (ref 35–47)
HGB BLD-MCNC: 12.5 G/DL (ref 11.5–16)
HGB UR QL STRIP: NEGATIVE
IMM GRANULOCYTES # BLD AUTO: 0.1 K/UL (ref 0–0.04)
IMM GRANULOCYTES NFR BLD AUTO: 1 % (ref 0–0.5)
KETONES UR QL STRIP.AUTO: NEGATIVE MG/DL
LEUKOCYTE ESTERASE UR QL STRIP.AUTO: NEGATIVE
LYMPHOCYTES # BLD: 1.1 K/UL (ref 0.8–3.5)
LYMPHOCYTES NFR BLD: 21 % (ref 12–49)
MCH RBC QN AUTO: 24.9 PG (ref 26–34)
MCHC RBC AUTO-ENTMCNC: 32.4 G/DL (ref 30–36.5)
MCV RBC AUTO: 76.7 FL (ref 80–99)
METHADONE UR QL: NEGATIVE
MONOCYTES # BLD: 0.3 K/UL (ref 0–1)
MONOCYTES NFR BLD: 7 % (ref 5–13)
NEUTS SEG # BLD: 3.7 K/UL (ref 1.8–8)
NEUTS SEG NFR BLD: 71 % (ref 32–75)
NITRITE UR QL STRIP.AUTO: NEGATIVE
NRBC # BLD: 0 K/UL (ref 0–0.01)
NRBC BLD-RTO: 0 PER 100 WBC
OPIATES UR QL: POSITIVE
PCP UR QL: NEGATIVE
PH UR STRIP: 5.5 [PH] (ref 5–8)
PLATELET # BLD AUTO: 221 K/UL (ref 150–400)
PMV BLD AUTO: 10.6 FL (ref 8.9–12.9)
POTASSIUM SERPL-SCNC: 3.6 MMOL/L (ref 3.5–5.1)
PROT SERPL-MCNC: 8.3 G/DL (ref 6.4–8.2)
PROT UR STRIP-MCNC: ABNORMAL MG/DL
RBC # BLD AUTO: 5.03 M/UL (ref 3.8–5.2)
RBC #/AREA URNS HPF: ABNORMAL /HPF (ref 0–5)
SALICYLATES SERPL-MCNC: 2.4 MG/DL (ref 2.8–20)
SARS-COV-2, COV2: NOT DETECTED
SODIUM SERPL-SCNC: 136 MMOL/L (ref 136–145)
SP GR UR REFRACTOMETRY: 1.02 (ref 1–1.03)
UA: UC IF INDICATED,UAUC: ABNORMAL
UROBILINOGEN UR QL STRIP.AUTO: 1 EU/DL (ref 0.2–1)
WBC # BLD AUTO: 5.3 K/UL (ref 3.6–11)
WBC URNS QL MICRO: ABNORMAL /HPF (ref 0–4)

## 2021-12-05 PROCEDURE — 90791 PSYCH DIAGNOSTIC EVALUATION: CPT

## 2021-12-05 PROCEDURE — 36415 COLL VENOUS BLD VENIPUNCTURE: CPT

## 2021-12-05 PROCEDURE — 81025 URINE PREGNANCY TEST: CPT

## 2021-12-05 PROCEDURE — 80053 COMPREHEN METABOLIC PANEL: CPT

## 2021-12-05 PROCEDURE — 80179 DRUG ASSAY SALICYLATE: CPT

## 2021-12-05 PROCEDURE — 99283 EMERGENCY DEPT VISIT LOW MDM: CPT

## 2021-12-05 PROCEDURE — 80143 DRUG ASSAY ACETAMINOPHEN: CPT

## 2021-12-05 PROCEDURE — 87636 SARSCOV2 & INF A&B AMP PRB: CPT

## 2021-12-05 PROCEDURE — 81001 URINALYSIS AUTO W/SCOPE: CPT

## 2021-12-05 PROCEDURE — 65220000003 HC RM SEMIPRIVATE PSYCH

## 2021-12-05 PROCEDURE — 85025 COMPLETE CBC W/AUTO DIFF WBC: CPT

## 2021-12-05 PROCEDURE — 82077 ASSAY SPEC XCP UR&BREATH IA: CPT

## 2021-12-05 PROCEDURE — 80307 DRUG TEST PRSMV CHEM ANLYZR: CPT

## 2021-12-06 LAB — APAP SERPL-MCNC: <2 UG/ML (ref 10–30)

## 2021-12-06 PROCEDURE — 74011250637 HC RX REV CODE- 250/637: Performed by: NURSE PRACTITIONER

## 2021-12-06 PROCEDURE — 74011250637 HC RX REV CODE- 250/637: Performed by: PSYCHIATRY & NEUROLOGY

## 2021-12-06 PROCEDURE — 65220000003 HC RM SEMIPRIVATE PSYCH

## 2021-12-06 RX ORDER — FOLIC ACID 1 MG/1
1 TABLET ORAL DAILY
Status: DISCONTINUED | OUTPATIENT
Start: 2021-12-06 | End: 2021-12-09 | Stop reason: HOSPADM

## 2021-12-06 RX ORDER — LOPERAMIDE HYDROCHLORIDE 2 MG/1
2 CAPSULE ORAL AS NEEDED
Status: DISCONTINUED | OUTPATIENT
Start: 2021-12-06 | End: 2021-12-09 | Stop reason: HOSPADM

## 2021-12-06 RX ORDER — ONDANSETRON 4 MG/1
4 TABLET, ORALLY DISINTEGRATING ORAL
Status: DISCONTINUED | OUTPATIENT
Start: 2021-12-06 | End: 2021-12-09 | Stop reason: HOSPADM

## 2021-12-06 RX ORDER — CLONIDINE HYDROCHLORIDE 0.1 MG/1
0.1 TABLET ORAL
Status: DISCONTINUED | OUTPATIENT
Start: 2021-12-06 | End: 2021-12-09 | Stop reason: HOSPADM

## 2021-12-06 RX ORDER — TRAZODONE HYDROCHLORIDE 50 MG/1
50 TABLET ORAL
Status: DISCONTINUED | OUTPATIENT
Start: 2021-12-06 | End: 2021-12-09 | Stop reason: HOSPADM

## 2021-12-06 RX ORDER — ADHESIVE BANDAGE
30 BANDAGE TOPICAL DAILY PRN
Status: DISCONTINUED | OUTPATIENT
Start: 2021-12-06 | End: 2021-12-09 | Stop reason: HOSPADM

## 2021-12-06 RX ORDER — ACETAMINOPHEN 325 MG/1
650 TABLET ORAL
Status: DISCONTINUED | OUTPATIENT
Start: 2021-12-06 | End: 2021-12-09 | Stop reason: HOSPADM

## 2021-12-06 RX ORDER — DIPHENHYDRAMINE HYDROCHLORIDE 50 MG/ML
50 INJECTION, SOLUTION INTRAMUSCULAR; INTRAVENOUS
Status: DISCONTINUED | OUTPATIENT
Start: 2021-12-06 | End: 2021-12-09 | Stop reason: HOSPADM

## 2021-12-06 RX ORDER — ONDANSETRON 4 MG/1
4 TABLET, ORALLY DISINTEGRATING ORAL 3 TIMES DAILY
Status: DISCONTINUED | OUTPATIENT
Start: 2021-12-06 | End: 2021-12-06

## 2021-12-06 RX ORDER — BENZTROPINE MESYLATE 1 MG/1
1 TABLET ORAL
Status: DISCONTINUED | OUTPATIENT
Start: 2021-12-06 | End: 2021-12-09 | Stop reason: HOSPADM

## 2021-12-06 RX ORDER — OLANZAPINE 5 MG/1
5 TABLET ORAL
Status: DISCONTINUED | OUTPATIENT
Start: 2021-12-06 | End: 2021-12-09 | Stop reason: HOSPADM

## 2021-12-06 RX ORDER — HYDROXYZINE 25 MG/1
50 TABLET, FILM COATED ORAL
Status: DISCONTINUED | OUTPATIENT
Start: 2021-12-06 | End: 2021-12-09 | Stop reason: HOSPADM

## 2021-12-06 RX ORDER — DICYCLOMINE HYDROCHLORIDE 10 MG/ML
20 INJECTION INTRAMUSCULAR
Status: DISCONTINUED | OUTPATIENT
Start: 2021-12-06 | End: 2021-12-09 | Stop reason: HOSPADM

## 2021-12-06 RX ORDER — DICYCLOMINE HYDROCHLORIDE 10 MG/1
10 CAPSULE ORAL
Status: DISCONTINUED | OUTPATIENT
Start: 2021-12-06 | End: 2021-12-09 | Stop reason: HOSPADM

## 2021-12-06 RX ORDER — IBUPROFEN 200 MG
1 TABLET ORAL DAILY
Status: DISCONTINUED | OUTPATIENT
Start: 2021-12-06 | End: 2021-12-09 | Stop reason: HOSPADM

## 2021-12-06 RX ORDER — LANOLIN ALCOHOL/MO/W.PET/CERES
100 CREAM (GRAM) TOPICAL DAILY
Status: DISCONTINUED | OUTPATIENT
Start: 2021-12-06 | End: 2021-12-09 | Stop reason: HOSPADM

## 2021-12-06 RX ORDER — LORAZEPAM 2 MG/ML
1 INJECTION INTRAMUSCULAR
Status: DISCONTINUED | OUTPATIENT
Start: 2021-12-06 | End: 2021-12-09 | Stop reason: HOSPADM

## 2021-12-06 RX ORDER — DOCUSATE SODIUM 100 MG/1
100 CAPSULE, LIQUID FILLED ORAL DAILY
Status: DISCONTINUED | OUTPATIENT
Start: 2021-12-06 | End: 2021-12-09 | Stop reason: HOSPADM

## 2021-12-06 RX ORDER — HALOPERIDOL 5 MG/ML
5 INJECTION INTRAMUSCULAR
Status: DISCONTINUED | OUTPATIENT
Start: 2021-12-06 | End: 2021-12-09 | Stop reason: HOSPADM

## 2021-12-06 RX ADMIN — TRAZODONE HYDROCHLORIDE 50 MG: 50 TABLET ORAL at 21:29

## 2021-12-06 RX ADMIN — Medication 100 MG: at 11:20

## 2021-12-06 RX ADMIN — ACETAMINOPHEN 650 MG: 325 TABLET ORAL at 11:20

## 2021-12-06 RX ADMIN — ONDANSETRON 4 MG: 4 TABLET, ORALLY DISINTEGRATING ORAL at 11:20

## 2021-12-06 RX ADMIN — FOLIC ACID 1 MG: 1 TABLET ORAL at 11:20

## 2021-12-06 RX ADMIN — CLONIDINE HYDROCHLORIDE 0.1 MG: 0.1 TABLET ORAL at 11:20

## 2021-12-06 RX ADMIN — CLONIDINE HYDROCHLORIDE 0.1 MG: 0.1 TABLET ORAL at 21:44

## 2021-12-06 NOTE — PROGRESS NOTES
Behavioral Services  Medicare Certification Upon Admission    I certify that this patient's inpatient psychiatric hospital admission is medically necessary for:    [x] (1) Treatment which could reasonably be expected to improve this patient's condition,       [x] (2) Or for diagnostic study;     AND     [x](2) The inpatient psychiatric services are provided while the individual is under the care of a physician and are included in the individualized plan of care.     Estimated length of stay/service 5 - 7 days    Plan for post-hospital care per social work    Electronically signed by Amanda Suggs MD on 12/6/2021 at 8:58 AM

## 2021-12-06 NOTE — PROGRESS NOTES
Patient provided orientation of unit and transitioned to her room. Patient provided with hygiene supplies and snacks. Staff observed the smell of cigarette smoke near patient's room. Patient's room/bathroom area assessed for cigarettes and contraband. Staff also completed an additional skin assessment of patient along with her personal belongings. Staff found no cigarettes or contraband of any type. Staff reiterated facility rules. Patient observed resting in bed throughout the night. Staff will continue to assess and monitor.           blem: Depressed Mood (Adult/Pediatric)  Goal: *STG: Participates in treatment plan  Outcome: Progressing Towards Goal  Goal: *STG: Demonstrates reduction in symptoms and increase in insight into coping skills/future focused  Outcome: Progressing Towards Goal  Goal: *STG: Remains safe in hospital  12/6/2021 0653 by Adrien Lee RN  Outcome: Progressing Towards Goal  12/6/2021 0115 by Adrien Lee RN  Outcome: Progressing Towards Goal  Goal: *STG: Complies with medication therapy  12/6/2021 0653 by Adrien Lee RN  Outcome: Progressing Towards Goal  12/6/2021 0115 by Adrien Lee RN  Outcome: Progressing Towards Goal

## 2021-12-06 NOTE — ED NOTES
Per Dr. Bishnu Martinez patient no longer needs a sitter. Pt resting quietly in room at this time. Pt has been calm and cooperative. Pt is in a visible room near nurses station.
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
## 2021-12-06 NOTE — GROUP NOTE
EMY  GROUP DOCUMENTATION INDIVIDUAL                                                                          Group Therapy Note    Date: 12/6/2021    Group Start Time: 1000  Group End Time: 1100  Group Topic: Topic Group    [facility] [place] [address] ACUTE BEHAV [facility]    Gustavo Capellan [facility] GROUP    Group Therapy Note    Attendees: 8         Attendance: Did not attend    Patient's Goal:     Interventions/techniques:  Kiki Smith

## 2021-12-06 NOTE — ED NOTES
Pt alert and  Verbal, pleasant . Pt vital taken unable to get pulse ox due to long nails and polish on toe nails. Pt shows no respiratory distress.

## 2021-12-06 NOTE — BSMART NOTE
Patient has been accepted to [facility] [facility] [place], Room 319. Accepting is Maira Reyna for Dr. Goyo Solorio.

## 2021-12-06 NOTE — BH NOTES
GROUP THERAPY PROGRESS NOTE    Patient did not participate in Coping Skills group.      Robert Alvarado MSW

## 2021-12-06 NOTE — ED NOTES
TRANSFER - OUT REPORT:    Verbal report given to Latasha Jerry(name) on Gorman Burkitt  being transferred to  319/01(unit) for routine progression of care       Report consisted of patients Situation, Background, Assessment and   Recommendations(SBAR). Information from the following report(s) SBAR, Kardex, ED Summary, [facility] and Recent Results was reviewed with the receiving nurse. Lines:       Opportunity for questions and clarification was provided.       Patient transported with:    [facility]
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
## 2021-12-06 NOTE — BH NOTES
GROUP THERAPY PROGRESS NOTE    Patient did not participate in Discharge Planning Group.      Ana Levy LPC LSATP [facility]

## 2021-12-06 NOTE — PROGRESS NOTES
Patient is an 39year old AA female admitted to the general unit with a primary diagnosis of depressed mood. Patient has documented co-morbidities of musculoskeletal d/c, Sickle Cell disease, chronic back pain, liver disease, hx of abnormal pap smear and breast d/o. Patient admitted from [facility] as an voluntary admission. Patient is alert and oriented X4. Patient admitted due to a reported suicide attempt. It has been reported by her family members that she attempted to walk in front of a bus. During admission, patient reported an suicide attempt in 2018 by overdose of medications. UDS (+) for opioids. Patient reported during admission that she has been purchasing and using Percocet tablets on a daily basis. Patient reported using (5) 10 mg tablets daily for the past month. UDS (-). Patient cooperative with admission process. Patient oriented to unit. Admission packet and confidentiality code given. Q 15 min checks initiated for safety.             Problem: Depressed Mood (Adult/Pediatric)  Goal: *STG: Participates in treatment plan  Outcome: Progressing Towards Goal  Goal: *STG: Remains safe in hospital  Outcome: Progressing Towards Goal  Goal: *STG: Complies with medication therapy  Outcome: Progressing Towards Goal

## 2021-12-06 NOTE — ED PROVIDER NOTES
EMERGENCY DEPARTMENT HISTORY AND PHYSICAL EXAM      Please note that this dictation was completed with the assistance of \"Dragon\", the computer voice recognition software. Quite often unanticipated grammatical, syntax, homophones, and other interpretive errors are inadvertently transcribed by the computer software. Please disregard these errors and any errors that have escaped final proofreading. Thank you. Patient: Britney Sheehan  DOS: 21  : 1985  MRN: [id number]  History of Presenting Illness     Chief Complaint   Patient presents with   [address] Problem     patient states she attempted to step in front of a bus today; is under a lot of stress     History Provided By: Patient/family/EMS (if applicable)    HPI: Britney Sheehan, 39 y.o. female with past medical history as documented below presents to the ED with c/o of suicide ideations and worsening depression. Pt states she tried walk out in front of a bus today in an attempt to harm herself. She has prior hx of suicide attempts with drug overdose and alcohol use. She states recent deaths in her family has caused her depression to be worst.  She was last admitted at [facility] in 2018 after suicide attempt with heroin overdose. Pt denies HI, AH/VH. Pt denies any other exacerbating or ameliorating factors. Additionally, pt specifically denies any recent fever, chills, headache, nausea, vomiting, abdominal pain, CP, SOB, lightheadedness, dizziness, numbness, weakness, lower extremity swelling, heart palpitations, urinary sxs, diarrhea, constipation, melena, hematochezia, cough, or congestion. There are no other complaints, changes or physical findings pertinent to the HPI at this time.     PCP: None  Past History   Past Medical History:  Past Medical History:   Diagnosis Date    Abnormal Papanicolaou smear of cervix     Back pain     Breast disorder     lump in left breast    Drug abuse (HCC)     Fatigue     High-risk pregnancy     Ill-defined condition     Sickle Cell    Joint pain     Liver disease     hep c    Musculoskeletal disorder     Sickle cell trait (HCC)     trait       Past Surgical History:  Past Surgical History:   Procedure Laterality Date    HX GYN          [place]  DELIVERY ONLY      c/s in        Family History:   Family history reviewed and was non-contributory, unless specified below:  Family History   Problem Relation Age of Onset    Diabetes Maternal Grandmother     Hypertension Maternal Grandmother     Cancer Maternal Grandfather        Social History:  Social History     Tobacco Use    Smoking status: Current Every Day Smoker     Packs/day: 0.50     Years: 8.00     Pack years: 4.00    Smokeless tobacco: Never Used   Substance Use Topics    Alcohol use: No     Alcohol/week: 5.8 standard drinks     Types: 7 Cans of beer per week     Comment: weekends    Drug use: Yes     Types: Inhalants, Heroin     Comment: only percocets per pt not prescribed       Allergies: Allergies   Allergen Reactions    Motrin [Ibuprofen] Itching       Current Medications:  No current facility-administered medications on file prior to encounter. Current Outpatient Medications on File Prior to Encounter   Medication Sig Dispense Refill    citalopram (CELEXA) 20 mg tablet Take 1 Tab by mouth nightly. (Patient not taking: Reported on 2021) 15 Tab 1    traZODone (DESYREL) 50 mg tablet Take 1 Tab by mouth nightly. (Patient not taking: Reported on 2021) 15 Tab 1     Review of Systems   A complete ROS was reviewed by me today and all other systems negative, unless otherwise specified below:  Review of Systems   Unable to perform ROS: Psychiatric disorder     Physical Exam   Physical Exam  Vitals and nursing note reviewed. Constitutional:       General: She is not in acute distress. Appearance: She is well-developed. She is not diaphoretic. HENT:      Head: Normocephalic and atraumatic. Mouth/Throat:      Pharynx: No oropharyngeal exudate. Eyes:      Conjunctiva/sclera: Conjunctivae normal.   Cardiovascular:      Rate and Rhythm: Normal rate and regular rhythm. Heart sounds: Normal heart sounds. Pulmonary:      Effort: Pulmonary effort is normal. No respiratory distress. Breath sounds: Normal breath sounds. No wheezing or rales. Chest:      Chest wall: No tenderness. Abdominal:      General: Bowel sounds are normal. There is no distension. Palpations: Abdomen is soft. There is no mass. Tenderness: There is no abdominal tenderness. There is no guarding or rebound. Musculoskeletal:         General: Normal range of motion. Cervical back: Normal range of motion. Skin:     General: Skin is warm. Neurological:      Mental Status: She is alert and oriented to person, place, and time. Cranial Nerves: No cranial nerve deficit. Motor: No abnormal muscle tone. Psychiatric:         Mood and Affect: Affect is flat and tearful. Behavior: Behavior is withdrawn. Thought Content: Thought content is not paranoid or delusional. Thought content includes suicidal ideation. Thought content does not include homicidal ideation. Thought content includes suicidal plan. Thought content does not include homicidal plan. Diagnostic Study Results     Laboratory Data  I have personally reviewed and interpreted all available laboratory results.    Recent Results (from the past 24 hour(s))   URINALYSIS W/ REFLEX CULTURE    Collection Time: 12/05/21  9:18 PM    Specimen: Urine   Result Value Ref Range    Color YELLOW/STRAW      Appearance CLEAR CLEAR      Specific gravity 1.020 1.003 - 1.030      pH (UA) 5.5 5.0 - 8.0      Protein TRACE (A) NEG mg/dL    Glucose Negative NEG mg/dL    Ketone Negative NEG mg/dL    Bilirubin Negative NEG      Blood Negative NEG      Urobilinogen 1.0 0.2 - 1.0 EU/dL    Nitrites Negative NEG      Leukocyte Esterase Negative NEG WBC 0-4 0 - 4 /hpf    RBC 0-5 0 - 5 /hpf    Epithelial cells FEW FEW /lpf    Bacteria 2+ (A) NEG /hpf    UA:UC IF INDICATED CULTURE NOT INDICATED BY UA RESULT CNI     DRUG SCREEN, URINE    Collection Time: 12/05/21  9:18 PM   Result Value Ref Range    AMPHETAMINES Negative NEG      BARBITURATES Negative NEG      BENZODIAZEPINES Negative NEG      COCAINE Negative NEG      METHADONE Negative NEG      OPIATES Positive (A) NEG      PCP(PHENCYCLIDINE) Negative NEG      THC (TH-CANNABINOL) Negative NEG      Drug screen comment (NOTE)    CBC WITH AUTOMATED DIFF    Collection Time: 12/05/21  9:18 PM   Result Value Ref Range    WBC 5.3 3.6 - 11.0 K/uL    RBC 5.03 3.80 - 5.20 M/uL    HGB 12.5 11.5 - 16.0 g/dL    HCT 38.6 35.0 - 47.0 %    MCV 76.7 (L) 80.0 - 99.0 FL    MCH 24.9 (L) 26.0 - 34.0 PG    MCHC 32.4 30.0 - 36.5 g/dL    RDW 16.3 (H) 11.5 - 14.5 %    PLATELET 683 413 - 587 K/uL    MPV 10.6 8.9 - 12.9 FL    NRBC 0.0 0  WBC    ABSOLUTE NRBC 0.00 0.00 - 0.01 K/uL    NEUTROPHILS 71 32 - 75 %    LYMPHOCYTES 21 12 - 49 %    MONOCYTES 7 5 - 13 %    EOSINOPHILS 0 0 - 7 %    BASOPHILS 0 0 - 1 %    IMMATURE GRANULOCYTES 1 (H) 0.0 - 0.5 %    ABS. NEUTROPHILS 3.7 1.8 - 8.0 K/UL    ABS. LYMPHOCYTES 1.1 0.8 - 3.5 K/UL    ABS. MONOCYTES 0.3 0.0 - 1.0 K/UL    ABS. EOSINOPHILS 0.0 0.0 - 0.4 K/UL    ABS. BASOPHILS 0.0 0.0 - 0.1 K/UL    ABS. IMM.  GRANS. 0.1 (H) 0.00 - 0.04 K/UL    DF AUTOMATED     METABOLIC PANEL, COMPREHENSIVE    Collection Time: 12/05/21  9:18 PM   Result Value Ref Range    Sodium 136 136 - 145 mmol/L    Potassium 3.6 3.5 - 5.1 mmol/L    Chloride 100 97 - 108 mmol/L    CO2 30 21 - 32 mmol/L    Anion gap 6 5 - 15 mmol/L    Glucose 115 (H) 65 - 100 mg/dL    BUN 8 6 - 20 MG/DL    Creatinine 0.86 0.55 - 1.02 MG/DL    BUN/Creatinine ratio 9 (L) 12 - 20      GFR est AA >60 >60 ml/min/1.73m2    GFR est non-AA >60 >60 ml/min/1.73m2    Calcium 9.0 8.5 - 10.1 MG/DL    Bilirubin, total 0.3 0.2 - 1.0 MG/DL    ALT (SGPT) 22 12 - 78 U/L    AST (SGOT) 41 (H) 15 - 37 U/L    Alk. phosphatase 358 (H) 45 - 117 U/L    Protein, total 8.3 (H) 6.4 - 8.2 g/dL    Albumin 3.0 (L) 3.5 - 5.0 g/dL    Globulin 5.3 (H) 2.0 - 4.0 g/dL    A-G Ratio 0.6 (L) 1.1 - 2.2     ETHYL ALCOHOL    Collection Time: 12/05/21  9:18 PM   Result Value Ref Range    ALCOHOL(ETHYL),SERUM <16 <15 MG/DL   SALICYLATE    Collection Time: 12/05/21  9:18 PM   Result Value Ref Range    Salicylate level 2.4 (L) 2.8 - 20.0 MG/DL   ACETAMINOPHEN    Collection Time: 12/05/21  9:18 PM   Result Value Ref Range    Acetaminophen level <2 (L) 10 - 30 ug/mL   COVID-19 WITH INFLUENZA A/B    Collection Time: 12/05/21  9:18 PM   Result Value Ref Range    SARS-CoV-2 Not detected NOTD      Influenza A by PCR Not detected      Influenza B by PCR Not detected     HCG URINE, QL. - POC    Collection Time: 12/05/21  9:30 PM   Result Value Ref Range    Pregnancy test,urine (POC) Negative NEG         Radiologic Studies   I have personally reviewed and interpreted all available imaging studies and agree with radiology interpretation. No orders to display     CT Results  (Last 48 hours)    None        CXR Results  (Last 48 hours)    None        Medical Decision Making   I am the first and primary ED physician for this patient's ED visit today. I reviewed our electronic medical record system for any past medical records that may contribute to the patient's current condition, including their past medical history, surgical history, social and family history. This also includes their most recent ED visits, previous hospitalizations and prior diagnostic data. I have reviewed and summarized the most pertinent findings in my HPI and MDM.     Vital Signs Reviewed:  Patient Vitals for the past 24 hrs:   Temp Pulse Resp BP SpO2   12/06/21 0031 98.3 °F (36.8 °C) 94 17 122/72 97 %   12/05/21 2034 99.2 °F (37.3 °C) 99 18 115/64 97 %     Pulse Oximetry Analysis: 97% on RA    Cardiac Monitor:   Rate: 99 bpm  The cardiac monitor revealed the following rhythm as interpreted by me: Normal Sinus Rhythm  Cardiac monitoring was ordered to monitor patient for signs of cardiac dysrhythmia, which they are at risk for based on their history and/or risk for cardiovascular disease and/or metabolic abnormalities. Records Reviewed: Nursing Notes, Old Medical Records, Previous electrocardiograms, Previous Radiology Studies and Previous Laboratory Studies, EMS reports    Provider Notes (Medical Decision Making):   Patient presents with acute suicidal ideation. DDx:  2/2 MDD, schizoaffective d/o, bipolar, drug induced, organic cause such as electrolyte anomoly or infection. Stable vitals and benign exam. No obvious organic causes to explain behavior but will obtain psych labs, UA, UDS and speak with mental health professional about possible admission. Pt is currently voluntary. Sitter at bedside. Will continue to monitor while in ED. ED Course:   Initial assessment performed. I discussed presenting problems and concerns, and my formulated plan for today's visit with the patient and any available family members. I have encouraged them to ask questions as they arise throughout the visit. Social History     Tobacco Use    Smoking status: Current Every Day Smoker     Packs/day: 0.50     Years: 8.00     Pack years: 4.00    Smokeless tobacco: Never Used   Substance Use Topics    Alcohol use: No     Alcohol/week: 5.8 standard drinks     Types: 7 Cans of beer per week     Comment: weekends    Drug use: Yes     Types: Inhalants, Heroin     Comment: only percocets per pt not prescribed     TOBACCO COUNSELING:  Upon evaluation, pt expressed that they are a current tobacco user. For approximately 3-5 mins, pt has been counseled on the dangers of smoking and was encouraged to quit as soon as possible in order to decrease further risks to their health.  Pt has conveyed their understanding of the risks involved should they continue to use tobacco products. ED Orders Placed:  Orders Placed This Encounter    COVID-19 WITH INFLUENZA A/B    URINALYSIS W/ REFLEX CULTURE    DRUG SCREEN, URINE    CBC WITH AUTOMATED DIFF    METABOLIC PANEL, COMPREHENSIVE    BLOOD ALCOHOL (Ethyl Alcohol)    SALICYLATE    ACETAMINOPHEN    ADULT DIET Regular    VITAL SIGNS PER UNIT ROUTINE    UP AD KASHMIR    NOTIFY PROVIDER: VITAL SIGNS CHANGES    NURSING-MISCELLANEOUS: Abnormal Involuntary Movement Scale (AIMS) ONCE on admission with previous or current history of antipsychotic use ONE TIME    FULL CODE    IP CONSULT TO INTERNAL MEDICINE    HCG URINE, QL. - POC    SAFETY PRECAUTIONS - EVERY 15 MIN ROUNDS    OLANZapine (ZyPREXA) tablet 5 mg    haloperidol lactate (HALDOL) injection 5 mg    benztropine (COGENTIN) tablet 1 mg    diphenhydrAMINE (BENADRYL) injection 50 mg    hydrOXYzine HCL (ATARAX) tablet 50 mg    LORazepam (ATIVAN) injection 1 mg    traZODone (DESYREL) tablet 50 mg    acetaminophen (TYLENOL) tablet 650 mg    magnesium hydroxide (MILK OF MAGNESIA) 400 mg/5 mL oral suspension 30 mL    nicotine (NICODERM CQ) 21 mg/24 hr patch 1 Patch    docusate sodium (COLACE) capsule 100 mg    benzocaine-menthoL (CHLORASEPTIC MAX) lozenge 1 Lozenge    INITIAL PHYSICIAN ORDER: INPATIENT Behavioral Health Non-Acute; 7. Inpatient psychiatric hospital admission is medically necessary for treatment which can reasonably be expected to improve the patients condition and/or for diagnostic study.     IP CONSULT TO BSMART    IP CONSULT TO CASE MANAGEMENT       ED Medications Administered During ED Course:  Medications   OLANZapine (ZyPREXA) tablet 5 mg (has no administration in time range)   haloperidol lactate (HALDOL) injection 5 mg (has no administration in time range)   benztropine (COGENTIN) tablet 1 mg (has no administration in time range)   diphenhydrAMINE (BENADRYL) injection 50 mg (has no administration in time range)   hydrOXYzine HCL (ATARAX) tablet 50 mg (has no administration in time range)   LORazepam (ATIVAN) injection 1 mg (has no administration in time range)   traZODone (DESYREL) tablet 50 mg (has no administration in time range)   acetaminophen (TYLENOL) tablet 650 mg (has no administration in time range)   magnesium hydroxide (MILK OF MAGNESIA) 400 mg/5 mL oral suspension 30 mL (has no administration in time range)   nicotine (NICODERM CQ) 21 mg/24 hr patch 1 Patch (has no administration in time range)   docusate sodium (COLACE) capsule 100 mg (has no administration in time range)   benzocaine-menthoL (CHLORASEPTIC MAX) lozenge 1 Lozenge (has no administration in time range)        Progress Note:  I have just re-evaluated the patient. Patient reports improvement of sx's. I have reviewed Her vital signs and determined there is currently no worsening in their condition or physical exam. Results have been reviewed with them and their questions have been answered. We will continue to review further results as they come available. Consult Note:  Slim Salas MD spoke with Jermain Hicks  Specialty: Javi Scott  Discussed pt's hx, disposition, and available diagnostic and imaging results. Reviewed care plans. Agree with management and plan thus far. Consultant will evaluate pt. Progress Note:  Pt is medically cleared for psychiatric disposition. ADMIT  Given the patient's current clinical presentation, I have a high level of concern for decompensation if discharged from the emergency department. Patient is being admitted to the hospital.  The results of their tests and reasons for their admission have been discussed with them and/or available family. They convey agreement and understanding for the need to be admitted and for their admission diagnosis. Consultation has been made with the inpatient physician specialist for hospitalization. Diagnosis   Clinical Impression:  1.  Current severe episode of major depressive disorder without psychotic features, unspecified whether recurrent ([facility] [address]    2. Suicidal ideations    3. Opioid use    4. PTSD (post-traumatic stress disorder)      Attestation:  Janene Belcher MD, am the attending of record for this patient. I personally performed the services described in this documentation on this date, 12/5/2021 for patient, Shayla Li. I have reviewed the chart and verified that the record is accurate and complete.

## 2021-12-06 NOTE — PROGRESS NOTES
[facility] Medication Reconciliation     Information obtained from: Patient interview, [address] [place] [address]   [address]: Yes    Comments/recommendations:  Denies taking any prescription or over-the-counter medications  Per chart review, has been abusing opiates (Percocet)  Preferred pharmacy confirmed: [address] on [address]    Medication changes (since last review):  Removed  Citalopram  Trazodone    [facility] was accessed to determine fill history of any controlled medications. Within the past year, the patient has filled the below controlled medications:   Buprenorphine/naloxone 8-2 mg SL films #14 for 7 DS on 8/26/21 & 8/16/21   [employer] pharmacy benefit data reflects medications filled and processed through the patient's insurance, however                this data does NOT capture whether the medication was picked up or is currently being taken by the patient. Total Time Spent: 10 minutes    Patient allergies:    Allergies as of 12/05/2021 - Fully Reviewed 12/05/2021   Allergen Reaction Noted    Motrin [ibuprofen] Itching 12/05/2021       Prior to admission medications:   None        Thank you,  FRANCESCO Pimentel [facility] Specialist, [address]: [phone] ([id number]  Pharmacy: [phone] ([id number])

## 2021-12-06 NOTE — ED TRIAGE NOTES
Patient states she attempted to walk out in front of bus today in an attempt to harm self. She has prior hx of attempting self harm with drugs and alcohol. She says she has had recent deaths in the family and is under a lot of stress.   Has been under psychiatric care in the past.

## 2021-12-06 NOTE — BH NOTES
GROUP THERAPY PROGRESS NOTE    Patient did not participate in Coping Skills Group.      Isa Ambrocio LPC [facility]

## 2021-12-06 NOTE — BH NOTES
GROUP THERAPY PROGRESS NOTE    Patient did not participate in Process group.      Nawaf Montanez LPC [facility]

## 2021-12-06 NOTE — ED NOTES
Emergency Department Nursing Plan of Care       The Nursing Plan of Care is developed from the Nursing assessment and Emergency Department Attending provider initial evaluation. The plan of care may be reviewed in the ED Provider note.     The Plan of Care was developed with the following considerations:   Patient / Family readiness to learn indicated by:verbalized understanding  Persons(s) to be included in education: patient  Barriers to Learning/Limitations:No    Signed   Reji Lee RN    1/28/2018   1:31 AM

## 2021-12-06 NOTE — H&P
[facility] HISTORY AND PHYSICAL    Name:  Stella Mckeon  MR#:  [id number]  :  1985  ACCOUNT #:  [de-identified]  ADMIT DATE:  2021    PSYCHIATRIC INTAKE NOTE    CHIEF COMPLAINT:  Withdrawals from opiates, she feels terrible, depressed. HISTORY OF PRESENT ILLNESS:  This is a 70-year-old female with a history of opiate dependence and abuse. She has been taking about 10 mg of Percocet 5 times a day. She started withdrawing from this, felt terrible, had thoughts of suicide, planned to get hit by a car or run into traffic. She denies being depressed at this time and just feeling physically ill. She has sickle cell trait and joint pain along with back pain and new pain issues that led her to this situation and she is here now for management of her withdrawals and her moods. PAST PSYCHIATRIC HISTORY:  Noncontributory. PAST MEDICAL HISTORY:  She has had back pain, breast disorder with lumps, sickle cell trait leading to the hepatitis C positive, some sort of musculoskeletal disorder and abnormal Pap smears on her cervix, and gynecologic surgeries and a delivery. ALLERGIES: MOTRIN CAUSES ITCHING. SOCIAL HISTORY:  Single, has 2 children. Recently moved in with her mother. Multiple deaths in the family recently as well causing a lot of added stress. She is a manager at [employer]. Smokes cigarettes approximately half a pack a day. Alcohol occasionally, may be 7 cans of beer a week at best and positive opiates and inhalants. She has used heroin in the past as well. MENTAL STATUS EXAM:  Adult female, calm, semi-cooperative, not so much irritable, but reports feeling physically terrible and withdrawn. Did not want to do much speaking, but responded with yes and no questions more than trying to give her story or her situation. No aggression or violence. Denies suicidal or homicidal ideations currently and no auditory or visual hallucinations.   Aware of her surroundings, location, and situation. Here for management of her condition. DIAGNOSES:  Substance-induced mood disorder, opiate dependence and withdrawal, bereavement. PLAN:  Admit for safety and stabilization, medication modification as needed, group therapy, individual therapy. ESTIMATED LENGTH OF STAY:  Five to seven days. DISPOSITION:  Planning with Social Work, opiate detox protocol. STRENGTHS:  Willingness for treatment. DISABILITIES:  Addiction. DINAH Dominguez MD      [id number]  D:  12/06/2021 12:57  T:  12/06/2021 14:44  JOB #:  [id number]

## 2021-12-06 NOTE — PROGRESS NOTES
Pt is isolative to their room for this shift. Pt voices complaints of going through withdraw and is provided prn medication as well as ginger ale and snacks. Pt denies SI, HI, AH and VH. Pt offers little outside of complaints related to the withdraw.

## 2021-12-06 NOTE — ED NOTES
Pt comes to ED states my family made me come. States\" from some stuff I was saying\". Stating they think Im going to run. Pt states my mind keeps racing. I cant stop them thoughts. States she has been feeling this way since this morning. States she is having thoughts of hurting herself. States my family tried to say I step in front of a bus but I didn't really do it. Then States\" I really don't know.

## 2021-12-06 NOTE — GROUP NOTE
EMY  GROUP DOCUMENTATION INDIVIDUAL                                                                          Group Therapy Note    Date: 12/6/2021    Group Start Time: 1400  Group End Time: 1500  Group Topic: Recreational/Music Therapy    [facility] [address] ACUTE BEHAV [facility]    Gustavo Capellan [facility] GROUP    Group Therapy Note    Attendees: 8         Attendance: Did not attend    Patient's Goal:      Interventions/techniques:  Sidney Acharya

## 2021-12-07 PROCEDURE — 74011250637 HC RX REV CODE- 250/637: Performed by: PSYCHIATRY & NEUROLOGY

## 2021-12-07 PROCEDURE — 65220000003 HC RM SEMIPRIVATE PSYCH

## 2021-12-07 PROCEDURE — 74011250637 HC RX REV CODE- 250/637: Performed by: NURSE PRACTITIONER

## 2021-12-07 RX ADMIN — Medication 100 MG: at 08:59

## 2021-12-07 RX ADMIN — DOCUSATE SODIUM 100 MG: 100 CAPSULE ORAL at 08:59

## 2021-12-07 RX ADMIN — FOLIC ACID 1 MG: 1 TABLET ORAL at 09:00

## 2021-12-07 NOTE — PROGRESS NOTES
0730 Pt received in room. Pt denies si/hi/ah/vh. Pt reports going to groups and eating outside in the dayroom. Anxiety=0/10 depression= 0/10. Pt is Aox4. Pt is calm and cooperative. Med and meal compliant. Pain level of 0/10 . Will continue to monitor for any changes.

## 2021-12-07 NOTE — BH NOTES
Behavioral Health Treatment Team Note     Patient goal(s) for today: continue taking meds as prescribe, engage in unit activities, participate in hygiene/ADLs, prepare for discharge, follow directions from staff, contact support team  Treatment team focus/goals: continue adjusting meds as needed, discharge planning, update support system    Progress note: Patient reports she is feeling better today. States that she is feeling hungry- that's why she couldn't sleep great last night. Has a prescription for suboxone at home. [facility] at [facility] sent the prescription. Has not been in two weeks so she has been discharged. Patient also considering methadone clinic or connecting to another suboxone program. Taking medications- they seem to be helping some. 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 She reports she was joking when she told her family that she was going to hurt herself. Processed with patient about caring for self. Will accept letter for work to excuse her. Denied SI/HI, AVH. SW emailed Genny Bedoya at [facility] to inquire who is point-person for OBOT and getting someone reconnected with services. LOS:  2  Expected LOS: 5-7    Insurance info/prescription coverage:  [employer] Medicaid   Date of last family contact:  No KARIN  Family requesting physician contact today:  no  Discharge plan:  Plans to return to mother's house  Guns in the home:  no   Outpatient provider(s):  No current providers- to be linked- Genny Bedoya at [facility].      Participating treatment team members: Nicole Mortensen, JESSIE Rivers, Ky Chris MD

## 2021-12-07 NOTE — PROGRESS NOTES
Problem: Depressed Mood (Adult/Pediatric)  Goal: *STG: Participates in treatment plan  Outcome: Progressing Towards Goal  Goal: *STG: Participates in 1:1 therapy sessions  Outcome: Progressing Towards Goal

## 2021-12-07 NOTE — BH NOTES
Psychiatric Progress Note    Patient: Kimani Barnett MRN: [id number]  SSN: [id number]    YOB: 1985  Age: 39 y.o. Sex: female      Admit Date: 12/5/2021    LOS: 2 days     Subjective:     Kimani Barnett  reports feeling a lot better and moods are fair. Discussed her job situation. Denies SI/HI/AH/VH. No aggression or violence. Appropriately interactive and aware. Tolerating medications well. Eating minimally as appetite is just coming back and sleeping fairly.     Objective:     Vitals:    12/06/21 0031 12/06/21 0819 12/06/21 2035 12/07/21 0834   BP: 122/72 (!) 141/88 (!) 151/78 130/84   Pulse: 94 96 79 84   Resp: 17 17 18 17   Temp: 98.3 °F (36.8 °C) 99.1 °F (37.3 °C) 97.4 °F (36.3 °C) 97.4 °F (36.3 °C)   SpO2: 97% 97% 100% 100%   Weight:       Height:            Mental Status Exam:   Sensorium  oriented to time, place and person   Relations cooperative   Eye Contact appropriate   Appearance:  age appropriate   Speech:  normal volume and non-pressured   Thought Process: goal directed   Thought Content free of delusions and free of hallucinations   Suicidal ideations none   Mood:  depressed   Affect:  constricted   Memory   adequate   Concentration:  adequate   Insight:  fair   Judgment:  fair       MEDICATIONS:  Current Facility-Administered Medications   Medication Dose Route Frequency    OLANZapine (ZyPREXA) tablet 5 mg  5 mg Oral Q6H PRN    haloperidol lactate (HALDOL) injection 5 mg  5 mg IntraMUSCular Q6H PRN    benztropine (COGENTIN) tablet 1 mg  1 mg Oral BID PRN    diphenhydrAMINE (BENADRYL) injection 50 mg  50 mg IntraMUSCular BID PRN    hydrOXYzine HCL (ATARAX) tablet 50 mg  50 mg Oral TID PRN    LORazepam (ATIVAN) injection 1 mg  1 mg IntraMUSCular Q4H PRN    traZODone (DESYREL) tablet 50 mg  50 mg Oral QHS PRN    acetaminophen (TYLENOL) tablet 650 mg  650 mg Oral Q4H PRN    magnesium hydroxide (MILK OF MAGNESIA) 400 mg/5 mL oral suspension 30 mL  30 mL Oral DAILY PRN  nicotine (NICODERM CQ) 21 mg/24 hr patch 1 Patch  1 Patch TransDERmal DAILY    docusate sodium (COLACE) capsule 100 mg  100 mg Oral DAILY    benzocaine-menthoL (CHLORASEPTIC MAX) lozenge 1 Lozenge  1 Lozenge Oral Q2H PRN    loperamide (IMODIUM) capsule 2 mg  2 mg Oral PRN    dicyclomine (BENTYL) 10 mg/mL injection 20 mg  20 mg IntraMUSCular Y9T PRN    folic acid (FOLVITE) tablet 1 mg  1 mg Oral DAILY    thiamine HCL (B-1) tablet 100 mg  100 mg Oral DAILY    cloNIDine HCL (CATAPRES) tablet 0.1 mg  0.1 mg Oral Q4H PRN    ondansetron (ZOFRAN ODT) tablet 4 mg  4 mg Oral Q6H PRN    dicyclomine (BENTYL) capsule 10 mg  10 mg Oral QID PRN      DISCUSSION:   the risks and benefits of the proposed medication    Lab/Data Review: All lab results for the last 24 hours reviewed. No results found for this or any previous visit (from the past 24 hour(s)). Assessment:     Principal Problem:    MDD (major depressive disorder) (12/5/2021)    Active Problems:    PTSD (post-traumatic stress disorder) (11/16/2018)      Opioid use disorder (12/5/2021)        Plan:     Continue current care  Collateral information  Continue detox  Disposition planning with social work for the next few days    I certify that this patient's inpatient psychiatric hospital services furnished since the previous certification were, and continue to be, required for treatment that could reasonably be expected to improve the patient's condition, or for diagnostic study, and that the patient continues to need, on a daily basis, active treatment furnished directly by or requiring the supervision of inpatient psychiatric facility personnel. In addition, the hospital records show that services furnished were intensive treatment services, admission or related services, or equivalent services.   Signed By: Bekah Rosenberg MD     December 7, 2021

## 2021-12-07 NOTE — BH NOTES
GROUP THERAPY PROGRESS NOTE    Patient did not participate in Substance abuse/Coping Skills group.      Kareem Sohemaker LPC LSATP CSAC

## 2021-12-07 NOTE — BH NOTES
GROUP THERAPY PROGRESS NOTE    Patient did not participate in Coping Skills group.      Loretta Good LPC LSATP CSAC

## 2021-12-07 NOTE — GROUP NOTE
EMY  GROUP DOCUMENTATION INDIVIDUAL                                                                          Group Therapy Note    Date: 12/7/2021    Group Start Time: 1400  Group End Time: 1500  Group Topic: Recreational/Music Therapy    [facility] - [place] [address] ACUTE BEHAV [facility]    [place], [address] [facility] GROUP DOCUMENTATION GROUP    Group Therapy Note    Attendees: 10         Attendance: Attended    Patient's Goal:  To concentrate on selected task    Interventions/techniques: Supported-crafts,games,music    Interactions: Minimal    Mental Status: Calm and Flat    Behavior/appearance: Needed prompting    Goals Achieved:  Attended group session      Additional Notes:  Pt elected to [facility] session early     Margaret Mcwilliams

## 2021-12-07 NOTE — BH NOTES
Assumed care of the patient. Patient was isolative to her room. She appeared withdrawn and apathetic. Patient was cooperative with the assessments, maintained poor eye contact and interacted minimally. Patient said, \"I am okay, in my bed all day. \"     Patient denied S.I/H. I/A/V/H, confirmed anxiety and depression. Patient complained of withdrawal symptoms so was administered Clonidine at 2144. She also requested PRN medication for sleep, administered Trazodone at 2129. Will continue to monitor and provide support as needed. Patient appeared to be sleeping for about 7.5 hours.

## 2021-12-07 NOTE — GROUP NOTE
EMY  GROUP DOCUMENTATION INDIVIDUAL                                                                          Group Therapy Note    Date: 12/7/2021    Group Start Time: 1000  Group End Time: 1100  Group Topic: Topic Group    [address] [facility] [address] ACUTE BEHAV [facility]    [place], [address] [facility] GROUP DOCUMENTATION GROUP    Group Therapy Note    Attendees: 7         Attendance: Attended    Patient's Goal:  To identify positive coping strategies a-z    Interventions/techniques: Supported-worksheet    Follows Directions:  Followed directions    Interactions: Interacted appropriately    Mental Status: Calm and Flat    Behavior/appearance: Attentive, Cooperative and Needed prompting    Goals Achieved: Able to engage in interactions, Able to listen to others and Discussed coping    Reather Seal

## 2021-12-07 NOTE — BH NOTES
GROUP THERAPY PROGRESS NOTE    Patient did not participate in a Coping Skills group.      JESSIE Geronimo

## 2021-12-08 PROCEDURE — 65220000003 HC RM SEMIPRIVATE PSYCH

## 2021-12-08 PROCEDURE — 74011250637 HC RX REV CODE- 250/637: Performed by: NURSE PRACTITIONER

## 2021-12-08 PROCEDURE — 74011250637 HC RX REV CODE- 250/637: Performed by: PSYCHIATRY & NEUROLOGY

## 2021-12-08 RX ADMIN — ONDANSETRON 4 MG: 4 TABLET, ORALLY DISINTEGRATING ORAL at 18:30

## 2021-12-08 RX ADMIN — DOCUSATE SODIUM 100 MG: 100 CAPSULE ORAL at 08:46

## 2021-12-08 RX ADMIN — HYDROXYZINE HYDROCHLORIDE 50 MG: 25 TABLET, FILM COATED ORAL at 20:07

## 2021-12-08 RX ADMIN — TRAZODONE HYDROCHLORIDE 50 MG: 50 TABLET ORAL at 01:22

## 2021-12-08 RX ADMIN — Medication 100 MG: at 08:47

## 2021-12-08 RX ADMIN — TRAZODONE HYDROCHLORIDE 50 MG: 50 TABLET ORAL at 20:07

## 2021-12-08 RX ADMIN — CLONIDINE HYDROCHLORIDE 0.1 MG: 0.1 TABLET ORAL at 20:07

## 2021-12-08 RX ADMIN — FOLIC ACID 1 MG: 1 TABLET ORAL at 08:45

## 2021-12-08 RX ADMIN — DICYCLOMINE HYDROCHLORIDE 10 MG: 10 CAPSULE ORAL at 13:39

## 2021-12-08 RX ADMIN — ACETAMINOPHEN 650 MG: 325 TABLET ORAL at 13:39

## 2021-12-08 NOTE — BH NOTES
GROUP THERAPY PROGRESS NOTE    Patient is participating in Coping Skills group. Group time:  50 minutes     Personal goal for participation: Discuss healthy vs unhealthy coping strategies to combat mental health challenges and symptoms. Goal orientation: Personal    Group therapy participation: minimal    Therapeutic interventions reviewed and discussed: Patients were provided psychoeducation on healthy vs unhealthy coping mechanisms. Patients discussed the problems they are currently dealing with, identified unhealthy coping strategies they use, and discussed the consequences of these unhealthy strategies including substance use. Patients then identified and discussed how to incorporate new, healthy coping activities into daily routine and when symptoms of mental health increase. Patients identified barriers to using these healthy coping strategies and how to overcome these barriers. Impression of participation: Pt presented with anxious mood, full affect, clear speech, goal directed thought stream. Pt walked in and out of dayroom throughout group session. Pt often rolling eyes at SW and when other patients were sharing personal information. Pt identified positive coping skills including music and exercise. Pt unwilling to identify her negative coping skills. Pt had side conversations with other patients but was redirectable.     JESSIE So

## 2021-12-08 NOTE — PROGRESS NOTES
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 Pt was accepting of staying on the unit at this time. Pt is compliant with scheduled medications and requested prn medication for general body aches and cramps which was effective. Pt has attended some groups but interacts minimally. Pt has eaten at all meals and remains mostly isolative to their room. Pt denies SI, HI, AH and VH.

## 2021-12-08 NOTE — BH NOTES
GROUP THERAPY PROGRESS NOTE    Patient did not participate in Coping Skills group.      Marcy Barr MSW

## 2021-12-08 NOTE — BH NOTES
GROUP THERAPY PROGRESS NOTE    Patient did not participate in Discharge Planning Group.      JESSIE Munoz

## 2021-12-08 NOTE — BH NOTES
Behavioral Health Treatment Team Note      Patient goal(s) for today: continue taking meds as prescribe, engage in unit activities, participate in hygiene/ADLs, prepare for discharge, follow directions from staff, contact support team  Treatment team focus/goals: continue adjusting meds as needed, discharge planning, update support system     Progress note: Patient reports she isn't getting sick anymore. Talked to her boss who encourages her to take her time returning to work. Patient discharge-oriented. Goes to [address] [place]. This is different from yesterday when she reported [address]. Denies SI/HI, AVH, appetite coming back, using the restroom and it's normal. Talking to mother every day. Provided consent for staff to speak to her. Sleeping better. Patient irritable and rolling her eyes while MD discussed plan for her discharge tomorrow. Patient in agreement with completing treatment and having outpatient supports set up. ERVIN coordinated with Leopoldo Antu at [facility] who reports patient has been disconnected from services for closer to two months and is unsure why she would have a suboxone prescription. She confirms patient was seen at [facility] location on [place] [address]. Patient has been discharged from services so she will need to go through same day access at either [address] [place], [place] [address] or [address]. Location Monday - Thursday 7:30AM to 3:00 PM or Friday until 11:00AM.     ERVIN coordinated with patient's mother who reports there's nothing wrong with patient and that she is happy patient is being discharged.  Able to return to the home.      LOS:  3                      Expected LOS: 4     Insurance info/prescription coverage:  [facility] [employer] Medicaid   Date of last family contact:  Mariann Mcgill [phone]  Family requesting physician contact today:  no  Discharge plan:  Plans to return to mother's house  Guns in the home:  no   Outpatient provider(s): No current providers- to be linked- Salvatore Santa at [facility].      Participating treatment team members: Aníbal Bach MSW, Jhon Chairez MD

## 2021-12-08 NOTE — GROUP NOTE
EMY  GROUP DOCUMENTATION INDIVIDUAL                                                                          Group Therapy Note    Date: 12/8/2021    Group Start Time: 1500  Group End Time: 1600  Group Topic: Recreational/Music Therapy    [facility] - [place] [address] ACUTE BEHAV [facility]    [place], [address] [facility] GROUP DOCUMENTATION GROUP    Group Therapy Note    Attendees: 6         Attendance: Attended    Patient's Goal:  To concentrate on selected task    Interventions/techniques: Supported-crafts,games,music    Follows Directions:  Followed directions    Interactions: Interacted appropriately    Mental Status: Calm    Behavior/appearance: Attentive, Cooperative and Needed prompting    Goals Achieved: Able to engage in interactions and Able to listen to others-active participant in game    Naty Thomas

## 2021-12-08 NOTE — GROUP NOTE
EMY  GROUP DOCUMENTATION INDIVIDUAL                                                                          Group Therapy Note    Date: 12/8/2021    Group Start Time: 0900  Group End Time: 1000  Group Topic: Topic Group    [address] [facility] 3 ACUTE BEHAV [facility]    [place], [address] [facility] GROUP DOCUMENTATION GROUP    Group Therapy Note    Attendees: 7         Attendance: Attended    Patient's Goal:  To participate in journey to wellness game    Interventions/techniques: Supported-choices in recovery    Follows Directions:  Followed directions    Interactions: Interacted appropriately    Mental Status: Calm    Behavior/appearance: Attentive, Cooperative and Needed prompting    Goals Achieved: Able to engage in interactions, Able to listen to others, Able to self-disclose and Discussed coping    Rj Hammond

## 2021-12-08 NOTE — PROGRESS NOTES
Problem: Depressed Mood (Adult/Pediatric)  Goal: *STG: Participates in treatment plan  Outcome: Progressing Towards Goal     Problem: Depressed Mood (Adult/Pediatric)  Goal: *STG: Participates in treatment plan  Outcome: Progressing Towards Goal  Goal: *STG: Remains safe in hospital  Outcome: Progressing Towards Goal     9388-5530: Assumed care of patient after receiving shift report from outgoing nurse. Patient was out and visible on unit, interacting appropriately with peers and staff. Affect is constricted , mood is anxious. Pt cooperative with vitals and assessment. A&O x 4. Independent in ADLs. Insight and concentration present. Gait is steady. Appetite patterns WNL. Denies AVH/SI/HI/Anxiety/Depression. Pt denies pain. Patient medication and diet compliant. Pt encouraged to continue to participate in care. [id number]: Patient resting in bed.    [id number]: Pt has been observed throughout the night. Total hours slept approximately 9. No s/s of distress noted. Patient has remained safe.

## 2021-12-08 NOTE — BH NOTES
Psychiatric Progress Note    Patient: Radha Eaton MRN: [id number]  SSN: [id number]    YOB: 1985  Age: 39 y.o. Sex: female      Admit Date: 12/5/2021    LOS: 3 days     Subjective:     Radha Eaton  reports feeling a lot better and moods are fair. Discussed her job situation. Denies SI/HI/AH/VH. No aggression or violence. Appropriately interactive and aware. Tolerating medications well. Eating minimally as appetite is just coming back and sleeping fairly. 12/08 - Radha Eaton reports feeling well and moods are good. Denies SI/HI/AH/VH. No aggression or violence. Appropriately interactive and aware. Tolerating medications well. Eating and sleeping fairly.   Discharge focused      Objective:     Vitals:    12/06/21 0819 12/06/21 2035 12/07/21 0834 12/07/21 2039   BP: (!) 141/88 (!) 151/78 130/84 130/78   Pulse: 96 79 84 80   Resp: 17 18 17 16   Temp: 99.1 °F (37.3 °C) 97.4 °F (36.3 °C) 97.4 °F (36.3 °C) 98.5 °F (36.9 °C)   SpO2: 97% 100% 100% 100%   Weight:       Height:            Mental Status Exam:   Sensorium  oriented to time, place and person   Relations cooperative   Eye Contact appropriate   Appearance:  age appropriate   Speech:  normal volume and non-pressured   Thought Process: goal directed   Thought Content free of delusions and free of hallucinations   Suicidal ideations none   Mood:  depressed   Affect:  constricted   Memory   adequate   Concentration:  adequate   Insight:  fair   Judgment:  fair       MEDICATIONS:  Current Facility-Administered Medications   Medication Dose Route Frequency    OLANZapine (ZyPREXA) tablet 5 mg  5 mg Oral Q6H PRN    haloperidol lactate (HALDOL) injection 5 mg  5 mg IntraMUSCular Q6H PRN    benztropine (COGENTIN) tablet 1 mg  1 mg Oral BID PRN    diphenhydrAMINE (BENADRYL) injection 50 mg  50 mg IntraMUSCular BID PRN    hydrOXYzine HCL (ATARAX) tablet 50 mg  50 mg Oral TID PRN    LORazepam (ATIVAN) injection 1 mg  1 mg IntraMUSCular Q4H PRN    traZODone (DESYREL) tablet 50 mg  50 mg Oral QHS PRN    acetaminophen (TYLENOL) tablet 650 mg  650 mg Oral Q4H PRN    magnesium hydroxide (MILK OF MAGNESIA) 400 mg/5 mL oral suspension 30 mL  30 mL Oral DAILY PRN    nicotine (NICODERM CQ) 21 mg/24 hr patch 1 Patch  1 Patch TransDERmal DAILY    docusate sodium (COLACE) capsule 100 mg  100 mg Oral DAILY    benzocaine-menthoL (CHLORASEPTIC MAX) lozenge 1 Lozenge  1 Lozenge Oral Q2H PRN    loperamide (IMODIUM) capsule 2 mg  2 mg Oral PRN    dicyclomine (BENTYL) 10 mg/mL injection 20 mg  20 mg IntraMUSCular F2Z PRN    folic acid (FOLVITE) tablet 1 mg  1 mg Oral DAILY    thiamine HCL (B-1) tablet 100 mg  100 mg Oral DAILY    cloNIDine HCL (CATAPRES) tablet 0.1 mg  0.1 mg Oral Q4H PRN    ondansetron (ZOFRAN ODT) tablet 4 mg  4 mg Oral Q6H PRN    dicyclomine (BENTYL) capsule 10 mg  10 mg Oral QID PRN      DISCUSSION:   the risks and benefits of the proposed medication    Lab/Data Review: All lab results for the last 24 hours reviewed. No results found for this or any previous visit (from the past 24 hour(s)). Assessment:     Principal Problem:    MDD (major depressive disorder) (12/5/2021)    Active Problems:    PTSD (post-traumatic stress disorder) (11/16/2018)      Opioid use disorder (12/5/2021)        Plan:     Continue current care  Collateral information  Continue detox  Disposition planning with social work for tomorrow    I certify that this patient's inpatient psychiatric hospital services furnished since the previous certification were, and continue to be, required for treatment that could reasonably be expected to improve the patient's condition, or for diagnostic study, and that the patient continues to need, on a daily basis, active treatment furnished directly by or requiring the supervision of inpatient psychiatric facility personnel.  In addition, the hospital records show that services furnished were intensive treatment services, admission or related services, or equivalent services.   Signed By: Lalito Hopkins MD     December 8, 2021

## 2021-12-09 VITALS
HEIGHT: 68 IN | WEIGHT: 130 LBS | DIASTOLIC BLOOD PRESSURE: 97 MMHG | SYSTOLIC BLOOD PRESSURE: 136 MMHG | BODY MASS INDEX: 19.7 KG/M2 | OXYGEN SATURATION: 99 % | TEMPERATURE: 98.2 F | HEART RATE: 99 BPM | RESPIRATION RATE: 18 BRPM

## 2021-12-09 PROCEDURE — 74011250637 HC RX REV CODE- 250/637: Performed by: PSYCHIATRY & NEUROLOGY

## 2021-12-09 RX ORDER — HYDROXYZINE 50 MG/1
50 TABLET, FILM COATED ORAL
Qty: 30 TABLET | Refills: 0 | Status: SHIPPED | OUTPATIENT
Start: 2021-12-09 | End: 2021-12-19

## 2021-12-09 RX ORDER — TRAZODONE HYDROCHLORIDE 50 MG/1
50 TABLET ORAL
Qty: 30 TABLET | Refills: 0 | Status: SHIPPED | OUTPATIENT
Start: 2021-12-09

## 2021-12-09 RX ADMIN — ONDANSETRON 4 MG: 4 TABLET, ORALLY DISINTEGRATING ORAL at 05:14

## 2021-12-09 NOTE — PROGRESS NOTES
Problem: Depressed Mood (Adult/Pediatric)  Goal: *STG: Participates in treatment plan  Outcome: Resolved/Not Met  Goal: *STG: Participates in 1:1 therapy sessions  Outcome: Resolved/Not Met  Goal: *STG: Verbalizes anger, guilt, and other feelings in a constructive manor  12/9/2021 1222 by Roxanna Mccarthy RN  Outcome: Resolved/Not Met  12/9/2021 1030 by Roxanna Mccarthy RN  Outcome: Progressing Towards Goal  Goal: *STG: Attends activities and groups  12/9/2021 1222 by Roxanna Mccarthy RN  Outcome: Resolved/Not Met  12/9/2021 1030 by Roxanna Mccarthy RN  Outcome: Progressing Towards Goal  Goal: *STG: Demonstrates reduction in symptoms and increase in insight into coping skills/future focused  Outcome: Resolved/Not Met  Goal: *STG: Remains safe in hospital  12/9/2021 1222 by Roxanna Mccarthy RN  Outcome: Resolved/Not Met  12/9/2021 1030 by Roxanna Mccarthy RN  Outcome: Progressing Towards Goal  Goal: *STG: Complies with medication therapy  Outcome: Resolved/Not Met  Goal: *LTG: Returns to previous level of functioning and participates with after care plan  Outcome: Resolved/Not Met  Goal: *LTG: Understands illness and can identify signs of relapse  Outcome: Resolved/Not Met  Goal: Interventions  Outcome: Resolved/Not Met     Problem: Patient Education: Go to Patient Education Activity  Goal: Patient/Family Education  Outcome: Resolved/Not Met     Problem: Depressed Mood (Adult/Pediatric)  Goal: *STG: Participates in treatment plan  Outcome: Resolved/Not Met  Goal: *STG: Participates in 1:1 therapy sessions  Outcome: Resolved/Not Met  Goal: *STG: Verbalizes anger, guilt, and other feelings in a constructive manor  Outcome: Resolved/Not Met  Goal: *STG: Attends activities and groups  Outcome: Resolved/Not Met  Goal: *STG: Demonstrates reduction in symptoms and increase in insight into coping skills/future focused  Outcome: Resolved/Not Met  Goal: *STG: Remains safe in hospital  Outcome: Resolved/Not Met  Goal: *STG: Complies with medication therapy  Outcome: Resolved/Not Met  Goal: *LTG: Returns to previous level of functioning and participates with after care plan  Outcome: Resolved/Not Met  Goal: *LTG: Understands illness and can identify signs of relapse  Outcome: Resolved/Not Met  Goal: Interventions  Outcome: Resolved/Not Met     Problem: Patient Education: Go to Patient Education Activity  Goal: Patient/Family Education  Outcome: Resolved/Not Met     Problem: Depressed Mood (Adult/Pediatric)  Goal: *STG: Participates in treatment plan  Outcome: Resolved/Not Met  Goal: *STG: Participates in 1:1 therapy sessions  Outcome: Resolved/Not Met  Goal: *STG: Verbalizes anger, guilt, and other feelings in a constructive manor  Outcome: Resolved/Not Met  Goal: *STG: Attends activities and groups  Outcome: Resolved/Not Met  Goal: *STG: Demonstrates reduction in symptoms and increase in insight into coping skills/future focused  Outcome: Resolved/Not Met  Goal: *STG: Remains safe in hospital  Outcome: Resolved/Not Met  Goal: *STG: Complies with medication therapy  Outcome: Resolved/Not Met  Goal: *LTG: Returns to previous level of functioning and participates with after care plan  Outcome: Resolved/Not Met  Goal: *LTG: Understands illness and can identify signs of relapse  Outcome: Resolved/Not Met  Goal: Interventions  Outcome: Resolved/Not Met     Problem: Patient Education: Go to Patient Education Activity  Goal: Patient/Family Education  Outcome: Resolved/Not Met     Problem: Discharge Planning  Goal: *Discharge to safe environment  Outcome: Resolved/Not Met  Goal: *Knowledge of medication management  Outcome: Resolved/Not Met  Goal: *Knowledge of discharge instructions  Outcome: Resolved/Not Met     Problem: Falls - Risk of  Goal: *Absence of Falls  Description: Document Nino Fall Risk and appropriate interventions in the flowsheet.   Outcome: Resolved/Not Met     Problem: Patient Education: Go to Patient Education Activity  Goal: Patient/Family Education  Outcome: Resolved/Not Met

## 2021-12-09 NOTE — DISCHARGE INSTRUCTIONS
DISCHARGE SUMMARY    Flavia Baer  : 1985  MRN: [id number]    The patient Alisha Gilliam exhibits the ability to control behavior in a less restrictive environment. Patient's level of functioning is improving. No assaultive/destructive behavior has been observed for the past 24 hours. No suicidal/homicidal threat or behavior has been observed for the past 24 hours. There is no evidence of serious medication side effects. Patient has not been in physical or protective restraints for at least the past 24 hours. If weapons involved, how are they secured? No weapons    Is patient aware of and in agreement with discharge plan? yes    Arrangements for medication:  Prescriptions sent to preferred pharmacy    Copy of discharge instructions to provider?:  yes    Arrangements for transportation home:  Lyft    Keep all follow up appointments as scheduled, continue to take prescribed medications per physician instructions.   Mental health crisis number:  424 or your local mental health crisis line number at [place] [address] Emergency WARM LINE      [phone] ([phone])      M-F: 9am to 9pm      Sat & Sun: 5pm - 9pm  National suicide prevention lines:                             5-666-QUNUORT ([phone])       [phone] ([phone])    Crisis Text Line:  Text HOME to 563337

## 2021-12-09 NOTE — BH NOTES
Behavioral Health Treatment Team Note      Patient goal(s) for today: continue taking meds as prescribe, engage in unit activities, participate in hygiene/ADLs, prepare for discharge, follow directions from staff, contact support team  Treatment team focus/goals: continue adjusting meds as needed, discharge planning, update support system     Progress note: Patient remains discharge oriented. Trazadone was helpful with sleep and she wants to continue it. Also wants the Atarax because it calmed her nerves. Inquired about abilify that she was taking at home. MD is not continuing. Education provided on difference between remeron and trazadone. Patient accepting of narcan prescription. Discharge plan reviewed and safety plan completed.  Patient denied SI/HI, AVH.      AURY:  3                      MIRFMRML LOS: 4     Insurance info/prescription coverage:  [facility] [employer]   Date of last family contact:  Sendy [phone]  Family requesting physician contact today:  no  Discharge plan:  Plans to return to mother's house  Guns in the home:  no   Outpatient provider(s):  No current providers- to be linked- Juany Fountain at [facility]     Participating treatment team JESSIE Waterman, Zach Diaz MD

## 2021-12-09 NOTE — GROUP NOTE
EMY  GROUP DOCUMENTATION INDIVIDUAL                                                                          Group Therapy Note    Date: 12/9/2021    Group Start Time: 0900  Group End Time: 1000  Group Topic: Topic Group    [facility] - [place] [address] ACUTE BEHAV [facility]    [place], [address] [facility] GROUP DOCUMENTATION GROUP    Group Therapy Note    Attendees: 8         Attendance: Attended    Patient's Goal:  To participate in relaxation activity    Interventions/techniques: Supported-game    Follows Directions:  Followed directions    Interactions: Interacted appropriately    Mental Status: Calm    Behavior/appearance: Attentive, Cooperative and Needed prompting    Goals Achieved: Able to engage in interactions and Able to listen to others    Rose Vasquez

## 2021-12-09 NOTE — PROGRESS NOTES
Pt was eager for discharge, pleasant and cooperative. Pt had refused morning medications as they state they felt fine and just wanted to leave. Pt has had discharge paperwork reviewed, understood and signed. When going through pt's belongings they had stated some clothes were missing. After searching the unit the ED was called to check if any belongings were left there. Pt's belongings were found in the ED and brought to the unit. Upon going through their jacket the pt has stated that her 2 bank cards were missing from her jacket and was adamant about being able to remember placing them in her jacket pocket. ED was contacted again but no cards were in their possession. Pt has made comments about going to their house to get their car and then coming back to \"raise some hell\" in the ED about her lost bank cards. Pt was more calm when leaving but still upset about the situation. Pt was escorted to the lyft ride, assisted into the vehicle and observed driving away.

## 2021-12-09 NOTE — DISCHARGE SUMMARY
PSYCHIATRIC DISCHARGE SUMMARY         IDENTIFICATION:    Patient Name  Shayla Li   Date of Birth 1985   [facility] [id number]   Medical Record Number  [id number]      Age  39 y.o. PCP None   Admit date:  12/5/2021    Discharge date: 12/9/2021   Room Number  319/01  @ [address]   Date of Service  12/9/2021            TYPE OF DISCHARGE: REGULAR               CONDITION AT DISCHARGE: improved       PROVISIONAL & DISCHARGE DIAGNOSES:    Problem List  Date Reviewed: 1/2/2013          Codes Class    * (Principal) MDD (major depressive disorder) ICD-10-CM: F32.9  ICD-9-CM: 296.20         Opioid use disorder ICD-10-CM: F11.90  ICD-9-CM: 305.50         Opioid dependence with withdrawal (HCC) ICD-10-CM: F11.23  ICD-9-CM: 292.0, 304.00         Moderate episode of recurrent major depressive disorder (HCC) ICD-10-CM: F33.1  ICD-9-CM: 296.32         PTSD (post-traumatic stress disorder) ICD-10-CM: F43.10  ICD-9-CM: 309.81         Depression ICD-10-CM: F32. A  ICD-9-CM: 854         Fetal demise before 25 weeks with retention of dead fetus ICD-10-CM: O36. 4XX0  ICD-9-CM: 726         Cervical incompetence ICD-10-CM: N88.3  ICD-9-CM: 622.5         Heroin use complicating pregnancy MMT-73-DY: O99.320, F11.90  ICD-9-CM: 648.40, 305.50         Breast mass, left ICD-10-CM: N63.20  ICD-9-CM: 611.72         Mastitis, left, acute ICD-10-CM: N61.0  ICD-9-CM: 611.0         Vaginal yeast infection ICD-10-CM: B37.3  ICD-9-CM: 112.1               Active Hospital Problems    *MDD (major depressive disorder)      Opioid use disorder      PTSD (post-traumatic stress disorder)        DISCHARGE DIAGNOSIS:   Axis I:  SEE ABOVE  Axis II: SEE ABOVE  Axis III: SEE ABOVE  Axis IV:  lack of structure  Axis V:  <50 on admission, 55+ on discharge     CC & HISTORY OF PRESENT ILLNESS:  HISTORY OF PRESENT ILLNESS:  This is a 68-year-old female with a history of opiate dependence and abuse.   She has been taking about 10 mg of Percocet 5 times a day. She started withdrawing from this, felt terrible, had thoughts of suicide, planned to get hit by a car or run into traffic. She denies being depressed at this time and just feeling physically ill. She has sickle cell trait and joint pain along with back pain and new pain issues that led her to this situation and she is here now for management of her withdrawals and her moods. SOCIAL HISTORY:    Social History     Socioeconomic History    Marital status: SINGLE     Spouse name: Not on file    Number of children: Not on file    Years of education: Not on file    Highest education level: Not on file   Occupational History    Not on file   Tobacco Use    Smoking status: Current Every Day Smoker     Packs/day: 0.50     Years: 8.00     Pack years: 4.00    Smokeless tobacco: Never Used   Substance and Sexual Activity    Alcohol use: No     Alcohol/week: 5.8 standard drinks     Types: 7 Cans of beer per week     Comment: weekends    Drug use: Yes     Types: Inhalants, Heroin     Comment: only percocets per pt not prescribed    Sexual activity: Yes     Partners: Male   Other Topics Concern    Not on file   Social History Narrative    Not on file     Social Determinants of Health     Financial Resource Strain:     Difficulty of Paying Living Expenses: Not on file   Food Insecurity:     Worried About Running Out of Food in the Last Year: Not on file    Meseret of Food in the Last Year: Not on file   Transportation Needs:     Lack of Transportation (Medical): Not on file    Lack of Transportation (Non-Medical):  Not on file   Physical Activity:     Days of Exercise per Week: Not on file    Minutes of Exercise per Session: Not on file   Stress:     Feeling of Stress : Not on file   Social Connections:     Frequency of Communication with Friends and Family: Not on file    Frequency of Social Gatherings with Friends and Family: Not on file    Attends [employer] Services: Not on file   Marlene Tran Active Member of Clubs or Organizations: Not on file    Attends Club or Organization Meetings: Not on file    Marital Status: Not on file   Intimate Partner Violence:     Fear of Current or Ex-Partner: Not on file    Emotionally Abused: Not on file    Physically Abused: Not on file    Sexually Abused: Not on file   Housing Stability:     Unable to Pay for Housing in the Last Year: Not on file    Number of [place] in the Last Year: Not on file    Unstable Housing in the Last Year: Not on file      FAMILY HISTORY:   Family History   Problem Relation Age of Onset    Diabetes Maternal Grandmother     Hypertension Maternal Grandmother     Cancer Maternal Grandfather              HOSPITALIZATION COURSE:    Yuko Herrera was admitted to the inpatient psychiatric unit [facility] for acute psychiatric stabilization in regards to symptomatology as described in the HPI above. The differential diagnosis at time of admission included: schizophrenia vs substance induced psychotic disorder schizoaffective vs bipolar vs adjustment disorder. While on the unit Yuko Herrera was involved in individual, group, occupational and milieu therapy. Psychiatric medications were adjusted during this hospitalization. Yuko Herrera demonstrated a progressive improvement in overall condition. Much of patient's initial presentation appeared to be related to situational stressors, effects of medication non-compliance drugs of abuse, and psychological factors. Please see individual progress notes for more specific details regarding patient's hospitalization course. Yuko Herrera reports feeling well and moods are good. Denies SI/HI/AH/VH. No aggression or violence. Appropriately interactive and aware. Tolerating medications well. Eating and sleeping fairly. Patient with request for discharge today. There are no grounds to seek a TDO.     At time of discharge, Yuko Herrera is without significant problems of depression, psychosis, or anne. Patient free of suicidal and homicidal ideations (appears to be at very low risk of suicide or homicide) and reports many positive predictive factors in terms of not attempting suicide or homicide. Overall presentation at time of discharge is most consistent with the diagnosis of Major Depressive Disorder. Patient has maximized benefit to be derived from acute inpatient psychiatric treatment. All members of the treatment team concur with each other in regards to plans for discharge today. Patient and family are aware and in agreement with discharge and discharge plan. LABS AND IMAGAING:    Labs Reviewed   URINALYSIS W/ REFLEX CULTURE - Abnormal; Notable for the following components:       Result Value    Protein TRACE (*)     Bacteria 2+ (*)     All other components within normal limits   DRUG SCREEN, URINE - Abnormal; Notable for the following components:    OPIATES Positive (*)     All other components within normal limits   CBC WITH AUTOMATED DIFF - Abnormal; Notable for the following components:    MCV 76.7 (*)     MCH 24.9 (*)     RDW 16.3 (*)     IMMATURE GRANULOCYTES 1 (*)     ABS. IMM. GRANS. 0.1 (*)     All other components within normal limits   METABOLIC PANEL, COMPREHENSIVE - Abnormal; Notable for the following components:    Glucose 115 (*)     BUN/Creatinine ratio 9 (*)     AST (SGOT) 41 (*)     Alk.  phosphatase 358 (*)     Protein, total 8.3 (*)     Albumin 3.0 (*)     Globulin 5.3 (*)     A-G Ratio 0.6 (*)     All other components within normal limits   SALICYLATE - Abnormal; Notable for the following components:    Salicylate level 2.4 (*)     All other components within normal limits   ACETAMINOPHEN - Abnormal; Notable for the following components:    Acetaminophen level <2 (*)     All other components within normal limits   COVID-19 WITH INFLUENZA A/B   ETHYL ALCOHOL   HCG URINE, QL. - POC     No results found for: DS35, PHEN, PHENO, PHENT, DILF, DS39, PHENY, PTN, VALF2, VALAC, VALP, VALPR, DS6, CRBAM, CRBAMP, CARB2, XCRBAM  Admission on 12/05/2021   Component Date Value Ref Range Status    Color 12/05/2021 YELLOW/STRAW    Final    Appearance 12/05/2021 CLEAR  CLEAR   Final    Specific gravity 12/05/2021 1.020  1.003 - 1.030   Final    pH (UA) 12/05/2021 5.5  5.0 - 8.0   Final    Protein 12/05/2021 TRACE* NEG mg/dL Final    Glucose 12/05/2021 Negative  NEG mg/dL Final    Ketone 12/05/2021 Negative  NEG mg/dL Final    Bilirubin 12/05/2021 Negative  NEG   Final    Blood 12/05/2021 Negative  NEG   Final    Urobilinogen 12/05/2021 1.0  0.2 - 1.0 EU/dL Final    Nitrites 12/05/2021 Negative  NEG   Final    Leukocyte Esterase 12/05/2021 Negative  NEG   Final    WBC 12/05/2021 0-4  0 - 4 /hpf Final    RBC 12/05/2021 0-5  0 - 5 /hpf Final    Epithelial cells 12/05/2021 FEW  FEW /lpf Final    Bacteria 12/05/2021 2+* NEG /hpf Final    UA:UC IF INDICATED 12/05/2021 CULTURE NOT INDICATED BY UA RESULT  CNI   Final    AMPHETAMINES 12/05/2021 Negative  NEG   Final    BARBITURATES 12/05/2021 Negative  NEG   Final    BENZODIAZEPINES 12/05/2021 Negative  NEG   Final    COCAINE 12/05/2021 Negative  NEG   Final    METHADONE 12/05/2021 Negative  NEG   Final    OPIATES 12/05/2021 Positive* NEG   Final    PCP(PHENCYCLIDINE) 12/05/2021 Negative  NEG   Final    THC (TH-CANNABINOL) 12/05/2021 Negative  NEG   Final    Drug screen comment 12/05/2021 (NOTE)   Final    WBC 12/05/2021 5.3  3.6 - 11.0 K/uL Final    RBC 12/05/2021 5.03  3.80 - 5.20 M/uL Final    HGB 12/05/2021 12.5  11.5 - 16.0 g/dL Final    HCT 12/05/2021 38.6  35.0 - 47.0 % Final    MCV 12/05/2021 76.7* 80.0 - 99.0 FL Final    MCH 12/05/2021 24.9* 26.0 - 34.0 PG Final    MCHC 12/05/2021 32.4  30.0 - 36.5 g/dL Final    RDW 12/05/2021 16.3* 11.5 - 14.5 % Final    PLATELET 44/90/3816 094  150 - 400 K/uL Final    MPV 12/05/2021 10.6  8.9 - 12.9 FL Final    NRBC 12/05/2021 0.0  0  WBC Final    ABSOLUTE NRBC 12/05/2021 0.00  0.00 - 0.01 K/uL Final    NEUTROPHILS 12/05/2021 71  32 - 75 % Final    LYMPHOCYTES 12/05/2021 21  12 - 49 % Final    MONOCYTES 12/05/2021 7  5 - 13 % Final    EOSINOPHILS 12/05/2021 0  0 - 7 % Final    BASOPHILS 12/05/2021 0  0 - 1 % Final    IMMATURE GRANULOCYTES 12/05/2021 1* 0.0 - 0.5 % Final    ABS. NEUTROPHILS 12/05/2021 3.7  1.8 - 8.0 K/UL Final    ABS. LYMPHOCYTES 12/05/2021 1.1  0.8 - 3.5 K/UL Final    ABS. MONOCYTES 12/05/2021 0.3  0.0 - 1.0 K/UL Final    ABS. EOSINOPHILS 12/05/2021 0.0  0.0 - 0.4 K/UL Final    ABS. BASOPHILS 12/05/2021 0.0  0.0 - 0.1 K/UL Final    ABS. IMM. GRANS. 12/05/2021 0.1* 0.00 - 0.04 K/UL Final    DF 12/05/2021 AUTOMATED    Final    Sodium 12/05/2021 136  136 - 145 mmol/L Final    Potassium 12/05/2021 3.6  3.5 - 5.1 mmol/L Final    Chloride 12/05/2021 100  97 - 108 mmol/L Final    CO2 12/05/2021 30  21 - 32 mmol/L Final    Anion gap 12/05/2021 6  5 - 15 mmol/L Final    Glucose 12/05/2021 115* 65 - 100 mg/dL Final    BUN 12/05/2021 8  6 - 20 MG/DL Final    Creatinine 12/05/2021 0.86  0.55 - 1.02 MG/DL Final    BUN/Creatinine ratio 12/05/2021 9* 12 - 20   Final    GFR est AA 12/05/2021 >60  >60 ml/min/1.73m2 Final    GFR est non-AA 12/05/2021 >60  >60 ml/min/1.73m2 Final    Calcium 12/05/2021 9.0  8.5 - 10.1 MG/DL Final    Bilirubin, total 12/05/2021 0.3  0.2 - 1.0 MG/DL Final    ALT (SGPT) 12/05/2021 22  12 - 78 U/L Final    AST (SGOT) 12/05/2021 41* 15 - 37 U/L Final    Alk.  phosphatase 12/05/2021 358* 45 - 117 U/L Final    Protein, total 12/05/2021 8.3* 6.4 - 8.2 g/dL Final    Albumin 12/05/2021 3.0* 3.5 - 5.0 g/dL Final    Globulin 12/05/2021 5.3* 2.0 - 4.0 g/dL Final    A-G Ratio 12/05/2021 0.6* 1.1 - 2.2   Final    ALCOHOL(ETHYL),SERUM 12/05/2021 <10  <10 MG/DL Final    Salicylate level 30/80/3219 2.4* 2.8 - 20.0 MG/DL Final    Acetaminophen level 12/05/2021 <2* 10 - 30 ug/mL Final    SARS-CoV-2 12/05/2021 Not detected  NOTD   Final    Influenza A by PCR 12/05/2021 Not detected    Final    Influenza B by PCR 12/05/2021 Not detected    Final    Pregnancy test,urine (POC) 12/05/2021 Negative  NEG   Final     No results found. DISPOSITION:    Home. Patient to f/u with drug/etoh rehabilitation, psychiatric, and psychotherapy appointments. Patient is to f/u with internist as directed. FOLLOW-UP CARE:    Activity as tolerated  Regular diet  Wound Care: none needed. Follow-up Information     Follow up With Specialties Details Why Contact Info    Narcotics Anonymous  Call Please contact for information on NA meetings [phone] (Substance Use and [facility])   Call Please contact for information on local substance use and recovery resources including groups and therapy options [phone] today According to [facility], your case has been closed. You will need to go to either [facility] location (other is [place] [address]) and go through same-day access. Monday-Thursday 7:30-3:00, Friday 7:30-11:00  [address]  [address] [place] [id number] [phone]    None    None (207) Patient stated that they have no PCP                   PROGNOSIS:   Fair ---- based on nature of patient's pathology/ies and treatment compliance issues. Prognosis is greatly dependent upon patient's ability to remain sober and to follow up with scheduled appointments as well as to comply with psychiatric medications as prescribed. DISCHARGE MEDICATIONS:     Informed consent given for the use of following psychotropic medications:  Current Discharge Medication List      START taking these medications    Details   traZODone (DESYREL) 50 mg tablet Take 1 Tablet by mouth nightly as needed for Sleep (For insomnia).  Indications: insomnia associated with depression  Qty: 30 Tablet, Refills: 0  Start date: 12/9/2021      hydrOXYzine HCL (ATARAX) 50 mg tablet Take 1 Tablet by mouth three (3) times daily as needed for Anxiety for up to 10 days. Indications: anxious  Qty: 30 Tablet, Refills: 0  Start date: 12/9/2021, End date: 12/19/2021                    A coordinated, multidisplinary treatment team round was conducted with Marco Antonio Signs is done daily here at [facility]. This team consists of the nurse, psychiatric unit pharmacist,  and writer. I have spent greater than 35 minutes on discharge work.     Signed:  Isabel Ba MD  12/9/2021

## 2021-12-09 NOTE — PROGRESS NOTES
Problem: Depressed Mood (Adult/Pediatric)  Goal: *STG: Verbalizes anger, guilt, and other feelings in a constructive manor  Outcome: Progressing Towards Goal  Goal: *STG: Attends activities and groups  Outcome: Progressing Towards Goal  Goal: *STG: Remains safe in hospital  Outcome: Progressing Towards Goal

## 2021-12-09 NOTE — PROGRESS NOTES
[id number]: Assumed care of patient after receiving shift report from day shift nurse. Patient was out and visible on unit, but withdrawn to self. Affect is flat, mood is anxious, rates it a 5/10. Denies depression SI/HI/AVH. Pt cooperative with vitals and assessment. A&O x 4. Independent in ADLs. Gait is steady. Pt denies pain. PRN Trazodone requested for sleep, Clonidine for opiate withdrawal, and Vistaril for anxiety. Pt encouraged to continue to participate in care. [id number]: Nursing rounds completed, no issues to note. [id number]: Pt has been observed throughout the night. Total hours slept approximately 8 hours. No s/s of distress noted. Patient has remained safe.     Problem: Depressed Mood (Adult/Pediatric)  Goal: *STG: Remains safe in hospital  Outcome: Progressing Towards Goal

## 2021-12-09 NOTE — PROGRESS NOTES
Pharmacist Discharge Medication Reconciliation    Discharging Provider: Dr. Balwinder Harley PMH:   Past Medical History:   Diagnosis Date    Abnormal Papanicolaou smear of cervix     Back pain     Breast disorder     lump in left breast    Drug abuse ([facility] [address]     Fatigue     High-risk pregnancy     Ill-defined condition     Sickle Cell    Joint pain     Liver disease     hep c    Musculoskeletal disorder     Sickle cell trait ([facility] [address]     trait       Chief Complaint for this Admission:   Chief Complaint   Patient presents with    Mental Health Problem     patient states she attempted to step in front of a bus today; is under a lot of stress     Allergies: Motrin [ibuprofen]    Discharge Medications:   Current Discharge Medication List        START taking these medications    Details   traZODone (DESYREL) 50 mg tablet Take 1 Tablet by mouth nightly as needed for Sleep (For insomnia). Indications: insomnia associated with depression  Qty: 30 Tablet, Refills: 0  Start date: 12/9/2021      hydrOXYzine HCL (ATARAX) 50 mg tablet Take 1 Tablet by mouth three (3) times daily as needed for Anxiety for up to 10 days.  Indications: anxious  Qty: 30 Tablet, Refills: 0  Start date: 12/9/2021, End date: 12/19/2021             The patient's chart, MAR and AVS were reviewed by LUIS ALFREDO BourneD, BCPS

## 2021-12-09 NOTE — BH NOTES
GROUP THERAPY PROGRESS NOTE    Patient is participating in coping skills group. Group time: 45 minutes    Personal goal for participation: Learn coping skills to reduce negative emotions    Goal orientation: Personal    Group therapy participation: passive    Therapeutic interventions reviewed and discussed: Group discussion on why being bored can be a problem and the consequences of boredom that patient have experienced. Patient discussed issues they have had with being bored and ways they have tried to combat boredom. This lead to discussion of coping skills for negative emotions and ways to feel better that each patient has tried or that they have heard of. Discussion of activities that help with boredom, challenges, potential solutions and plans. Impression of participation: Yumiko Archibald arrived late to group. She accepted a handout but sat away from group and declined to join group at the table. She reports that she is leaving today. She sat in group listening. At one point she put her head back and closed her eyes. She got up shortly after this going into the evangelista and did not return to group.     Edward Lesches LPC [facility]

## 2021-12-10 NOTE — BH NOTES
Behavioral Health Transition Record to Provider    Patient Name: Nurys Cormier  YOB: 1985  Medical Record Number: [id number]  Date of Admission: 12/5/2021  Date of Discharge: 12/9/2021    Attending Provider: No att. providers found  Discharging Provider: Dr Joshua Dominique MD  To contact this individual call [phone] and ask the  to page. If unavailable, ask to be transferred to [facility] Provider on call. [facility] Provider will be available on call 24/7 and during holidays. Primary Care Provider: None    Allergies   Allergen Reactions    Motrin [Ibuprofen] Itching       Reason for Admission:  This is a 27-year-old female with a history of opiate dependence and abuse. Enrique Jiménez has been taking about 10 mg of Percocet 5 times a day.  She started withdrawing from this, felt terrible, had thoughts of suicide, planned to get hit by a car or run into traffic. Enrique Jiménez denies being depressed at this time and just feeling physically ill. Enrique Jiménez has sickle cell trait and joint pain along with back pain and new pain issues that led her to this situation and she is here now for management of her withdrawals and her moods    Admission Diagnosis: MDD (major depressive disorder) [F32.9]  PTSD (post-traumatic stress disorder) [F43.10]  Opioid use disorder [F11.90]    * No surgery found *    Results for orders placed or performed during the hospital encounter of 12/05/21   COVID-19 WITH INFLUENZA A/B   Result Value Ref Range    SARS-CoV-2 Not detected NOTD      Influenza A by PCR Not detected      Influenza B by PCR Not detected     URINALYSIS W/ REFLEX CULTURE    Specimen: Urine   Result Value Ref Range    Color YELLOW/STRAW      Appearance CLEAR CLEAR      Specific gravity 1.020 1.003 - 1.030      pH (UA) 5.5 5.0 - 8.0      Protein TRACE (A) NEG mg/dL    Glucose Negative NEG mg/dL    Ketone Negative NEG mg/dL    Bilirubin Negative NEG      Blood Negative NEG      Urobilinogen 1.0 0.2 - 1.0 EU/dL    Nitrites Negative NEG      Leukocyte Esterase Negative NEG      WBC 0-4 0 - 4 /hpf    RBC 0-5 0 - 5 /hpf    Epithelial cells FEW FEW /lpf    Bacteria 2+ (A) NEG /hpf    UA:UC IF INDICATED CULTURE NOT INDICATED BY UA RESULT CNI     DRUG SCREEN, URINE   Result Value Ref Range    AMPHETAMINES Negative NEG      BARBITURATES Negative NEG      BENZODIAZEPINES Negative NEG      COCAINE Negative NEG      METHADONE Negative NEG      OPIATES Positive (A) NEG      PCP(PHENCYCLIDINE) Negative NEG      THC (TH-CANNABINOL) Negative NEG      Drug screen comment (NOTE)    CBC WITH AUTOMATED DIFF   Result Value Ref Range    WBC 5.3 3.6 - 11.0 K/uL    RBC 5.03 3.80 - 5.20 M/uL    HGB 12.5 11.5 - 16.0 g/dL    HCT 38.6 35.0 - 47.0 %    MCV 76.7 (L) 80.0 - 99.0 FL    MCH 24.9 (L) 26.0 - 34.0 PG    MCHC 32.4 30.0 - 36.5 g/dL    RDW 16.3 (H) 11.5 - 14.5 %    PLATELET 071 994 - 487 K/uL    MPV 10.6 8.9 - 12.9 FL    NRBC 0.0 0  WBC    ABSOLUTE NRBC 0.00 0.00 - 0.01 K/uL    NEUTROPHILS 71 32 - 75 %    LYMPHOCYTES 21 12 - 49 %    MONOCYTES 7 5 - 13 %    EOSINOPHILS 0 0 - 7 %    BASOPHILS 0 0 - 1 %    IMMATURE GRANULOCYTES 1 (H) 0.0 - 0.5 %    ABS. NEUTROPHILS 3.7 1.8 - 8.0 K/UL    ABS. LYMPHOCYTES 1.1 0.8 - 3.5 K/UL    ABS. MONOCYTES 0.3 0.0 - 1.0 K/UL    ABS. EOSINOPHILS 0.0 0.0 - 0.4 K/UL    ABS. BASOPHILS 0.0 0.0 - 0.1 K/UL    ABS. IMM.  GRANS. 0.1 (H) 0.00 - 0.04 K/UL    DF AUTOMATED     METABOLIC PANEL, COMPREHENSIVE   Result Value Ref Range    Sodium 136 136 - 145 mmol/L    Potassium 3.6 3.5 - 5.1 mmol/L    Chloride 100 97 - 108 mmol/L    CO2 30 21 - 32 mmol/L    Anion gap 6 5 - 15 mmol/L    Glucose 115 (H) 65 - 100 mg/dL    BUN 8 6 - 20 MG/DL    Creatinine 0.86 0.55 - 1.02 MG/DL    BUN/Creatinine ratio 9 (L) 12 - 20      GFR est AA >60 >60 ml/min/1.73m2    GFR est non-AA >60 >60 ml/min/1.73m2    Calcium 9.0 8.5 - 10.1 MG/DL    Bilirubin, total 0.3 0.2 - 1.0 MG/DL    ALT (SGPT) 22 12 - 78 U/L    AST (SGOT) 41 (H) 15 - 37 U/L    Alk. phosphatase 358 (H) 45 - 117 U/L    Protein, total 8.3 (H) 6.4 - 8.2 g/dL    Albumin 3.0 (L) 3.5 - 5.0 g/dL    Globulin 5.3 (H) 2.0 - 4.0 g/dL    A-G Ratio 0.6 (L) 1.1 - 2.2     ETHYL ALCOHOL   Result Value Ref Range    ALCOHOL(ETHYL),SERUM <63 <99 MG/DL   SALICYLATE   Result Value Ref Range    Salicylate level 2.4 (L) 2.8 - 20.0 MG/DL   ACETAMINOPHEN   Result Value Ref Range    Acetaminophen level <2 (L) 10 - 30 ug/mL   HCG URINE, QL. - POC   Result Value Ref Range    Pregnancy test,urine (POC) Negative NEG         Immunizations administered during this encounter:   Immunization History   Administered Date(s) Administered    Rho(D) Immune Globulin - IM 2016, 2018       Screening for Metabolic Disorders for Patients on Antipsychotic Medications  (Data obtained from the EMR)    Estimated Body Mass Index  Estimated body mass index is 19.77 kg/m² as calculated from the following:    Height as of this encounter: 5' 8\" (1.727 m). Weight as of this encounter: 59 kg (130 lb). Vital Signs/Blood Pressure  Visit Vitals  BP (!) 136/97   Pulse 99   Temp 98.2 °F (36.8 °C)   Resp 18   Ht 5' 8\" (1.727 m)   Wt 59 kg (130 lb)   LMP  (Within Weeks)   SpO2 99%   Breastfeeding No   BMI 19.77 kg/m²       Blood Glucose/Hemoglobin A1c  Lab Results   Component Value Date/Time    Glucose 115 (H) 2021 09:18 PM    Glucose (POC) 107 (H) 2011 08:51 PM       No results found for: HBA1C, EOT2BKWL     Lipid Panel  No results found for: CHOL, CHOLX, CHLST, CHOLV, 581008, HDL, HDLP, LDL, LDLC, DLDLP, TGLX, TRIGL, TRIGP, CHHD, CHHDX     Discharge Diagnosis: Major Depressive Disorder    Discharge Plan: Return home    DISCHARGE SUMMARY    Genny Douglas  : 1985  MRN: [id number]    The patient Mor Escoto exhibits the ability to control behavior in a less restrictive environment. Patient's level of functioning is improving.   No assaultive/destructive behavior has been observed for the past 24 hours. No suicidal/homicidal threat or behavior has been observed for the past 24 hours. There is no evidence of serious medication side effects. Patient has not been in physical or protective restraints for at least the past 24 hours. If weapons involved, how are they secured? NA    Is patient aware of and in agreement with discharge plan? Yes    Arrangements for medication:  Prescriptions sent to pharmacy     Copy of discharge instructions to provider?:  Yes    Arrangements for transportation home:  Lyft    Keep all follow up appointments as scheduled, continue to take prescribed medications per physician instructions. Mental health crisis number:  [phone] or your local mental health crisis line number at [address] [facility] Emergency WARM LINE      [phone])      M-F: 9am to 9pm      Sat & Sun: 5pm  9pm  National suicide prevention lines:                             5-459-SKMBFXN ([phone])       5-691-237-TALK ([phone])   24/7 Crisis Text Line:  Text HOME to [phone]      Discharge Medication List and Instructions:   Discharge Medication List as of 12/9/2021 12:24 PM      START taking these medications    Details   traZODone (DESYREL) 50 mg tablet Take 1 Tablet by mouth nightly as needed for Sleep (For insomnia). Indications: insomnia associated with depression, Normal, Disp-30 Tablet, R-0      hydrOXYzine HCL (ATARAX) 50 mg tablet Take 1 Tablet by mouth three (3) times daily as needed for Anxiety for up to 10 days.  Indications: anxious, Normal, Disp-30 Tablet, R-0             Unresulted Labs (24h ago, onward)            None        To obtain results of studies pending at discharge, please contact [phone]    Follow-up Information     Follow up With Specialties Details Why Contact Info    Narcotics Anonymous  Call Please contact for information on NA meetings [phone] (Substance Use and [address])   Call Please contact for information on local substance use and recovery resources including groups and therapy options [phone] today According to [facility], your case has been closed. You will need to go to either [facility] location (other is [place] [address]) and go through same-day access. Monday-Thursday 7:30-3:00, Friday 7:30-11:00  [address]   [address]  [phone]    None    None (628) Patient stated that they have no PCP            Advanced Directive:   Does the patient have an appointed surrogate decision maker? No  Does the patient have a Medical Advance Directive? No  Does the patient have a Psychiatric Advance Directive? No  If the patient does not have a surrogate or Medical Advance Directive AND Psychiatric Advance Directive, the patient was offered information on these advance directives Patient will complete at a later time    Patient Instructions: Please continue all medications until otherwise directed by physician. Tobacco Cessation Discharge Plan:   Is the patient a smoker and needs referral for smoking cessation? Not applicable  Patient referred to the following for smoking cessation with an appointment? Not applicable     Patient was offered medication to assist with smoking cessation at discharge? Not applicable  Was education for smoking cessation added to the discharge instructions? Not applicable    Alcohol/Substance Abuse Discharge Plan:   Does the patient have a history of substance/alcohol abuse and requires a referral for treatment? Not applicable  Patient referred to the following for substance/alcohol abuse treatment with an appointment? Not applicable  Patient was offered medication to assist with alcohol cessation at discharge? Not applicable  Was education for substance/alcohol abuse added to discharge instructions?  Not applicable    Patient discharged to Home; discussed with patient/caregiver and provided to the patient/caregiver either in hard copy or electronically.

## 2022-03-18 PROBLEM — F33.1 MODERATE EPISODE OF RECURRENT MAJOR DEPRESSIVE DISORDER (HCC): Status: ACTIVE | Noted: 2018-11-16

## 2022-03-18 PROBLEM — F32.A DEPRESSION: Status: ACTIVE | Noted: 2018-11-15

## 2022-03-18 PROBLEM — F32.9 MDD (MAJOR DEPRESSIVE DISORDER): Status: ACTIVE | Noted: 2021-12-05

## 2022-03-18 PROBLEM — F11.90 OPIOID USE DISORDER: Status: ACTIVE | Noted: 2021-12-05

## 2022-03-18 PROBLEM — F11.23 OPIOID DEPENDENCE WITH WITHDRAWAL (HCC): Status: ACTIVE | Noted: 2018-11-16

## 2022-03-19 PROBLEM — F43.10 PTSD (POST-TRAUMATIC STRESS DISORDER): Status: ACTIVE | Noted: 2018-11-16

## 2023-05-10 RX ORDER — TRAZODONE HYDROCHLORIDE 50 MG/1
TABLET ORAL
COMMUNITY
Start: 2021-12-09

## 2025-09-05 ENCOUNTER — HOSPITAL ENCOUNTER (EMERGENCY)
Facility: HOSPITAL | Age: 40
Discharge: HOME | End: 2025-09-05
Attending: STUDENT IN AN ORGANIZED HEALTH CARE EDUCATION/TRAINING PROGRAM
Payer: OTHER MISCELLANEOUS

## 2025-09-05 ENCOUNTER — APPOINTMENT (OUTPATIENT)
Facility: HOSPITAL | Age: 40
End: 2025-09-05
Payer: OTHER MISCELLANEOUS

## 2025-09-05 VITALS
DIASTOLIC BLOOD PRESSURE: 72 MMHG | RESPIRATION RATE: 17 BRPM | OXYGEN SATURATION: 92 % | WEIGHT: 174.82 LBS | SYSTOLIC BLOOD PRESSURE: 158 MMHG | TEMPERATURE: 98.6 F | HEIGHT: 67 IN | BODY MASS INDEX: 27.44 KG/M2 | HEART RATE: 78 BPM

## 2025-09-05 DIAGNOSIS — S06.9XAA TRAUMATIC BRAIN INJURY, WITH UNKNOWN LOSS OF CONSCIOUSNESS STATUS, INITIAL ENCOUNTER (HCC): Primary | ICD-10-CM
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
## 2025-09-05 PROCEDURE — 74177 CT ABD & PELVIS W/CONTRAST: CPT

## 2025-09-05 PROCEDURE — 6360000004 HC RX CONTRAST MEDICATION: Performed by: STUDENT IN AN ORGANIZED HEALTH CARE EDUCATION/TRAINING PROGRAM

## 2025-09-05 PROCEDURE — 6360000002 HC RX W HCPCS: Performed by: STUDENT IN AN ORGANIZED HEALTH CARE EDUCATION/TRAINING PROGRAM

## 2025-09-05 PROCEDURE — 6370000000 HC RX 637 (ALT 250 FOR IP): Performed by: STUDENT IN AN ORGANIZED HEALTH CARE EDUCATION/TRAINING PROGRAM

## 2025-09-05 PROCEDURE — 70450 CT HEAD/BRAIN W/O DYE: CPT

## 2025-09-05 PROCEDURE — 72125 CT NECK SPINE W/O DYE: CPT

## 2025-09-05 RX ORDER — FENTANYL CITRATE 50 UG/ML
50 INJECTION, SOLUTION INTRAMUSCULAR; INTRAVENOUS
Refills: 0 | Status: COMPLETED | OUTPATIENT
Start: 2025-09-05 | End: 2025-09-05

## 2025-09-05 RX ORDER — ACETAMINOPHEN 500 MG
1000 TABLET ORAL
Status: COMPLETED | OUTPATIENT
Start: 2025-09-05 | End: 2025-09-05

## 2025-09-05 RX ORDER — METHOCARBAMOL 750 MG/1
750 TABLET, FILM COATED ORAL 4 TIMES DAILY
Qty: 40 TABLET | Refills: 0 | Status: SHIPPED | OUTPATIENT
Start: 2025-09-05 | End: 2025-09-15

## 2025-09-05 RX ORDER — METHOCARBAMOL 750 MG/1
750 TABLET, FILM COATED ORAL
Status: COMPLETED | OUTPATIENT
Start: 2025-09-05 | End: 2025-09-05

## 2025-09-05 RX ORDER — LIDOCAINE 4 G/G
2 PATCH TOPICAL
Status: DISCONTINUED | OUTPATIENT
Start: 2025-09-05 | End: 2025-09-05 | Stop reason: HOSPADM

## 2025-09-05 RX ORDER — LIDOCAINE 50 MG/G
2 PATCH TOPICAL DAILY
Qty: 20 PATCH | Refills: 0 | Status: SHIPPED | OUTPATIENT
Start: 2025-09-05 | End: 2025-09-15

## 2025-09-05 RX ORDER — IOPAMIDOL 755 MG/ML
100 INJECTION, SOLUTION INTRAVASCULAR
Status: COMPLETED | OUTPATIENT
Start: 2025-09-05 | End: 2025-09-05

## 2025-09-05 RX ORDER — IBUPROFEN 800 MG/1
800 TABLET, FILM COATED ORAL EVERY 6 HOURS PRN
Qty: 56 TABLET | Refills: 0 | Status: SHIPPED | OUTPATIENT
Start: 2025-09-05 | End: 2025-09-19

## 2025-09-05 RX ORDER — KETOROLAC TROMETHAMINE 30 MG/ML
15 INJECTION, SOLUTION INTRAMUSCULAR; INTRAVENOUS
Status: COMPLETED | OUTPATIENT
Start: 2025-09-05 | End: 2025-09-05

## 2025-09-05 RX ADMIN — IOPAMIDOL 100 ML: 755 INJECTION, SOLUTION INTRAVENOUS at 17:28

## 2025-09-05 RX ADMIN — METHOCARBAMOL 750 MG: 750 TABLET ORAL at 18:58

## 2025-09-05 RX ADMIN — ACETAMINOPHEN 1000 MG: 500 TABLET ORAL at 18:57

## 2025-09-05 RX ADMIN — KETOROLAC TROMETHAMINE 15 MG: 30 INJECTION, SOLUTION INTRAMUSCULAR at 18:58

## 2025-09-05 RX ADMIN — FENTANYL CITRATE 50 MCG: 50 INJECTION INTRAMUSCULAR; INTRAVENOUS at 17:33

## 2025-09-05 ASSESSMENT — PAIN DESCRIPTION - LOCATION
LOCATION: NECK;HEAD;BACK
LOCATION: HEAD
LOCATION: NECK;BACK

## 2025-09-05 ASSESSMENT — PAIN SCALES - GENERAL
PAINLEVEL_OUTOF10: 10
PAINLEVEL_OUTOF10: 10
PAINLEVEL_OUTOF10: 7

## 2025-09-05 ASSESSMENT — PAIN - FUNCTIONAL ASSESSMENT
PAIN_FUNCTIONAL_ASSESSMENT: 0-10
PAIN_FUNCTIONAL_ASSESSMENT: 0-10

## 2025-09-05 ASSESSMENT — PAIN DESCRIPTION - DESCRIPTORS
DESCRIPTORS: SHARP
DESCRIPTORS: ACHING;DISCOMFORT